# Patient Record
Sex: FEMALE | Race: BLACK OR AFRICAN AMERICAN | Employment: FULL TIME | URBAN - METROPOLITAN AREA
[De-identification: names, ages, dates, MRNs, and addresses within clinical notes are randomized per-mention and may not be internally consistent; named-entity substitution may affect disease eponyms.]

---

## 2018-08-02 ENCOUNTER — TELEPHONE (OUTPATIENT)
Dept: NEUROLOGY | Age: 53
End: 2018-08-02

## 2018-08-02 NOTE — TELEPHONE ENCOUNTER
----- Message from Driss Vences sent at 8/2/2018  9:26 AM EDT -----  Regarding: Dr. Kelly Coats  Pt is calling to speak with nurse regarding a sooner appt with Dr. Herve Chakraborty for migraines. She stated she has knots above her eyebrow where she had injection and wanted to know if was normal or not. She would like e-mail sent for practice address. If a sooner appt comes available leave detailed message she works 8 pm to 8 am.[phone]. PSR NOTE: Called Patient and scheduled her for 08/17/18 @ 9:00am w/ Dr. Herve Chakraborty. Patient was hoping to speak with nurse ahead of appt about knots at injection site.

## 2018-08-02 NOTE — TELEPHONE ENCOUNTER
Patient advised to contact the facility that did injection to let them know about reaction.  Pt voiced understanding , pt does have follow up scheduled for 8/17

## 2018-08-17 ENCOUNTER — OFFICE VISIT (OUTPATIENT)
Dept: NEUROLOGY | Age: 53
End: 2018-08-17

## 2018-08-17 VITALS
HEIGHT: 65 IN | HEART RATE: 78 BPM | DIASTOLIC BLOOD PRESSURE: 82 MMHG | SYSTOLIC BLOOD PRESSURE: 128 MMHG | RESPIRATION RATE: 16 BRPM | WEIGHT: 190 LBS | BODY MASS INDEX: 31.65 KG/M2 | OXYGEN SATURATION: 99 %

## 2018-08-17 DIAGNOSIS — G43.011 INTRACTABLE MIGRAINE WITHOUT AURA AND WITH STATUS MIGRAINOSUS: Primary | ICD-10-CM

## 2018-08-17 RX ORDER — ROSUVASTATIN CALCIUM 5 MG/1
TABLET, COATED ORAL
COMMUNITY
Start: 2018-08-10 | End: 2018-12-28

## 2018-08-17 RX ORDER — ONDANSETRON 4 MG/1
4 TABLET, FILM COATED ORAL
COMMUNITY
End: 2018-12-28

## 2018-08-17 RX ORDER — SUMATRIPTAN 100 MG/1
TABLET, FILM COATED ORAL
COMMUNITY
Start: 2018-07-01 | End: 2018-12-28

## 2018-08-17 RX ORDER — ZOLPIDEM TARTRATE 10 MG/1
TABLET ORAL
COMMUNITY
Start: 2018-08-03 | End: 2022-08-10 | Stop reason: ALTCHOICE

## 2018-08-17 RX ORDER — SUMATRIPTAN 3 MG/1
INJECTION, SOLUTION SUBCUTANEOUS
Qty: 8 SYRINGE | Refills: 3 | Status: SHIPPED | OUTPATIENT
Start: 2018-08-17 | End: 2018-09-18 | Stop reason: SDUPTHER

## 2018-08-17 RX ORDER — BUTALBITAL, ACETAMINOPHEN AND CAFFEINE 300; 40; 50 MG/1; MG/1; MG/1
CAPSULE ORAL
COMMUNITY
Start: 2018-08-13 | End: 2018-12-28

## 2018-08-17 RX ORDER — ESTRADIOL 0.5 MG/1
TABLET ORAL
COMMUNITY
Start: 2018-05-13

## 2018-08-17 NOTE — PATIENT INSTRUCTIONS
[facility]   Statement to Patients  April 1, 2014      In an effort to ensure the large volume of patient prescription refills is processed in the most efficient and expeditious manner, we are asking our patients to assist us by calling your Pharmacy for all prescription refills, this will include also your  Mail Order Pharmacy. The pharmacy will contact our office electronically to continue the refill process. Please do not wait until the last minute to call your pharmacy. We need at least 48 hours (2days) to fill prescriptions. We also encourage you to call your pharmacy before going to  your prescription to make sure it is ready. With regard to controlled substance prescription refill requests (narcotic refills) that need to be picked up at our office, we ask your cooperation by providing us with at least 72 hours (3days) notice that you will need a refill. We will not refill narcotic prescription refill requests after 4:00pm on any weekday, Monday through Thursday, or after 2:00pm on Fridays, or on the weekends. We encourage everyone to explore another way of getting your prescription refill request processed using [employer]aze [employer], our patient web portal through our electronic medical record system. Blaze DFM is an efficient and effective way to communicate your medication request directly to the office and  downloadable as an griselda on your smart phone . Blaze DFM also features a review functionality that allows you to view your medication list as well as leave messages for your physician. Are you ready to get connected? If so please review the attatched instructions or speak to any of our staff to get you set up right away! Thank you so much for your cooperation. Should you have any questions please contact our Practice Administrator.     The Physicians and Staff,  [facility]   Patient Instruction Plan/ Result Policy    If we have ordered testing for you, know that; \"NO NEWS IS GOOD NEWS! \" It is our policy that we know longer call patients with results, nor do we  give test results over the phone. We schedule follow up appointments so that your results can be discussed in person. This allows you to address any questions you have regarding the results. If something of concern is revealed on your test, we will contact you to discuss the matter and if needed schedule a sooner follow up appointment. Additionally, results may be found by using the My Chart feature and one of our patient service representatives at the  can give you instructions on how to access this feature to utilize our electronic medical record system. Thank you for your understanding.

## 2018-08-17 NOTE — PROGRESS NOTES
[address] [facility]. [address]   [place] [address] [place] [address]   [phone] [facility]   [phone] Fax             Referring: Self    Chief Complaint   Patient presents with    Head Pain     72-year-old right-handed woman who presents today for evaluation of what she calls 3-4 day migraines. She tells me that she has had headaches for over 10 years now. She has seen several neurologists. She notes that she has taken a multitude of preventative medications in the past and she has been told by her previous neurologist that he has done everything for her. She has been on preventatives including Depakote Topamax TCAs etc.  She was referred for Botox. She has had 2 rounds of Botox and has not noticed any changes in her headache frequency. She tells me that she is having in an average month 3-4 migraines and that they are lasting 3-4 days. She is unable to get significant relief. She is using oral sumatriptan and says she will get relief with that but then she gets recurrence. She has to take it 3 days in a row. It makes her nauseated. She has also used rizatriptan in the past.  She most recently was seen down at [facility]. I have reviewed those records where she had difficulty with topiramate making her jittery dizzy and uncomfortable. She had difficulty with Depakote as well. She failed rizatriptan. She was also given Benadryl Compazine and Toradol. Not had any focal deficits with a headache. She does have significant nausea. She is use Zofran and has not been helpful. She has never had any loss of consciousness with a headache. No palpitations. No chest pain. No shortness of breath. She is also tried verapamil. That was not effective. She is tried steroids in the past as well.       Past Medical History:   Diagnosis Date    Migraine        Past Surgical History:   Procedure Laterality Date    HX GYN  2010    hysterectomy       Current Outpatient Prescriptions   Medication Sig Dispense Refill    butalbital-acetaminophen-caff (FIORICET) -40 mg per capsule       estradiol (ESTRACE) 0.5 mg tablet       rosuvastatin (CRESTOR) 5 mg tablet       SUMAtriptan (IMITREX) 100 mg tablet       zolpidem (AMBIEN) 10 mg tablet       ondansetron hcl (ZOFRAN) 4 mg tablet Take 4 mg by mouth every eight (8) hours as needed for Nausea.  cyclobenzaprine (FLEXERIL) 10 mg tablet Take 1 tablet by mouth three (3) times daily as needed for Muscle Spasm(s). 20 tablet 0    tramadol (ULTRAM) 50 mg tablet Take 1 tablet by mouth every six (6) hours as needed for Pain. 15 tablet 0    conjugated estrogens (PREMARIN) 0.3 mg tablet Take  by mouth. Allergies   Allergen Reactions    Sulfa (Sulfonamide Antibiotics) Unknown (comments)     Broke out in a rash in her mouth       Social History   Substance Use Topics    Smoking status: Never Smoker    Smokeless tobacco: Never Used    Alcohol use Yes      Comment: rarely       Family History   Problem Relation Age of Onset    Diabetes Mother     Cancer Father        Review of Systems  Pertinent positives and negatives as noted with remainder of comprehensive review negative    Examination  Visit Vitals    /82    Pulse 78    Resp 16    Ht 5' 5\" (1.651 m)    Wt 86.2 kg (190 lb)    LMP 11/14/1992    SpO2 99%    BMI 31.62 kg/m2     Pleasant, well appearing. No icterus. Oropharynx clear. Supple neck without bruit. Heart regular. No murmur. No edema. Neurologically, she is awake, alert, and oriented with normal speech and language. Her cognition is normal.  She is able to discuss her medical history. She is able to discuss her medications. She is able to discuss current events. Intact cranial nerves 2-12. No nystagmus. Visual fields full to confrontation. Disk margins are flat bilaterally. She has normal bulk and tone. She has no abnormal movement. She has no pronation or drift.   She generates full strength in the upper and lower extremities to direct confrontational testing. Reflexes are symmetrical in the upper and lower extremities bilaterally. Her toes are down bilaterally. No Hyman. Finger nose finger and rapid alternating movements are normal.  Steady gait. She is able to heel, toe, and tandem walk. No sensory deficit to primary modalities. No Romberg. Impression/Plan  82-year-old lady with refractory migraine headaches with failures of multiple preventative medications including Botox. Discussed options. Discussed migraine pathophysiology. Discussed the new CG RP inhibitors. Will start Aimovig and discussed administration, potential side effects, potential benefits. She is looking forward to trying this. Discussed that this is targeted to the chemical that is thought to cause migraine. Will start that process. We will have her track her headaches on a calendar bring that each appointment. In terms of abortive therapy she has had difficulty using the oral preparations sumatriptan. Would like to try to use an injectable to get rapid relief and discussed using Zembrace 3 mg injection. Discussed and demonstrated the administration. Discussed potential side effects. Confirm she has not had any type of vascular disease including coronary artery disease or uncontrolled hypertension. She will use 1 injection at headache onset repeat in 1 hour and she can use up to 4 injections in 24 hours. Follow-up in 3 months with her headache diary and this will give her time to be on the Aimovig ×2 months to give an adequate trial.    Jules Pandey MD      This note was created using voice recognition software. Despite editing, there may be syntax errors. This note will not be viewable in [address]

## 2018-08-17 NOTE — MR AVS SNAPSHOT
[address] [facility] [address] [facility] [address] [place] [id number] [phone] Patient: Madhav Ambriz MRN: [id number] YSS:[id number] Visit Information Date & Time Provider Department Dept. Phone Encounter #  
 8/17/2018  9:30 AM Alex Chavarria MD [facility] [phone] [id number] Follow-up Instructions Return in about 3 months (around 11/17/2018). Upcoming Health Maintenance Date Due  
 PAP AKA CERVICAL CYTOLOGY 10/1/2015 BREAST CANCER SCRN MAMMOGRAM 1/30/2017 FOBT Q 1 YEAR AGE 50-75 1/30/2017 DTaP/Tdap/Td series (2 - Td) 10/1/2022 Allergies as of 8/17/2018  Review Complete On: 8/17/2018 By: Alex Chavarria MD  
  
 Severity Noted Reaction Type Reactions Sulfa (Sulfonamide Antibiotics)  07/04/2011    Unknown (comments) Broke out in a rash in her mouth Current Immunizations  Never Reviewed Name Date TDAP Vaccine 10/1/2012 Not reviewed this visit Vitals BP Pulse Resp Height(growth percentile) Weight(growth percentile) LMP  
 128/82 78 16 5' 5\" (1.651 m) 190 lb (86.2 kg) 11/14/1992 SpO2 BMI OB Status Smoking Status 99% 31.62 kg/m2 Hysterectomy Never Smoker BMI and BSA Data Body Mass Index Body Surface Area  
 31.62 kg/m 2 1.99 m 2 Preferred Pharmacy Pharmacy Name Phone [address], [address] [place] [address] [address] [facility] [address] [phone] Your Updated Medication List  
  
   
This list is accurate as of 8/17/18 10:55 AM.  Always use your most recent med list.  
  
  
  
  
 butalbital-acetaminophen-caff -40 mg per capsule Commonly known as:  FIORICET  
  
 cyclobenzaprine 10 mg tablet Commonly known as:  FLEXERIL Take 1 tablet by mouth three (3) times daily as needed for Muscle Spasm(s). erenumab-aooe 70 mg/mL Atin Commonly known as:  AIMOVIG AUTOINJECTOR  
 70 mg by SubCUTAneous route every month.  
  
 estradiol 0.5 mg tablet Commonly known as:  ESTRACE  
  
 PREMARIN 0.3 mg tablet Generic drug:  conjugated estrogens Take  by mouth. rosuvastatin 5 mg tablet Commonly known as:  CRESTOR  
  
 * SUMAtriptan 100 mg tablet Commonly known as:  IMITREX * ZEMBRACE SYMTOUCH 3 mg/0.5 mL Pnij Generic drug:  SUMAtriptan succinate 1 at HA onset and repeat q 1 hour until DODD relieved or used 4 in 24 hours BRAND MEDICALLY NECESSARY  
  
 traMADol 50 mg tablet Commonly known as:  ULTRAM  
Take 1 tablet by mouth every six (6) hours as needed for Pain. ZOFRAN 4 mg tablet Generic drug:  ondansetron hcl Take 4 mg by mouth every eight (8) hours as needed for Nausea. zolpidem 10 mg tablet Commonly known as:  AMBIEN  
  
 * Notice: This list has 2 medication(s) that are the same as other medications prescribed for you. Read the directions carefully, and ask your doctor or other care provider to review them with you. Prescriptions Sent to Pharmacy Refills  
 erenumab-aooe (AIMOVIG AUTOINJECTOR) 70 mg/mL atIn 3 Si mg by SubCUTAneous route every month. Class: Normal  
 Pharmacy: [facility] [address] AAYUSH DAVENPORT [address], [address] [place] [address] [address] [place] [address] Ph #: [phone] Route: SubCUTAneous ZEMBRACE SYMTOUCH 3 mg/0.5 mL pnij 3 Si at HA onset and repeat q 1 hour until DODD relieved or used 4 in 24 hours BRAND MEDICALLY NECESSARY Class: Normal  
 Pharmacy: [facility] [address] AAYUSH DAVENPORT [address], [address] [place] [address] [address] [facility] [address] Ph #: [phone] Follow-up Instructions Return in about 3 months (around 2018). Patient Instructions PRESCRIPTION REFILL POLICY [facility] Statement to Patients 2014 In an effort to ensure the large volume of patient prescription refills is processed in the most efficient and expeditious manner, we are asking our patients to assist us by calling your Pharmacy for all prescription refills, this will include also your  Mail Order Pharmacy. The pharmacy will contact our office electronically to continue the refill process. Please do not wait until the last minute to call your pharmacy. We need at least 48 hours (2days) to fill prescriptions. We also encourage you to call your pharmacy before going to  your prescription to make sure it is ready. With regard to controlled substance prescription refill requests (narcotic refills) that need to be picked up at our office, we ask your cooperation by providing us with at least 72 hours (3days) notice that you will need a refill. We will not refill narcotic prescription refill requests after 4:00pm on any weekday, Monday through Thursday, or after 2:00pm on Fridays, or on the weekends. We encourage everyone to explore another way of getting your prescription refill request processed using H[employer], our patient web portal through our electronic medical record system. Hoppit is an efficient and effective way to communicate your medication request directly to the office and  downloadable as an griselda on your smart phone . H[employer]it also features a review functionality that allows you to view your medication list as well as leave messages for your physician. Are you ready to get connected? If so please review the attatched instructions or speak to any of our staff to get you set up right away! Thank you so much for your cooperation. Should you have any questions please contact our Practice Administrator. The Physicians and Staff,  [facility] Patient Instruction Plan/ Result Policy If we have ordered testing for you, know that; \"NO NEWS IS GOOD NEWS! \" It is our policy that we know longer call patients with results, nor do we  give test results over the phone.   We schedule follow up appointments so that your results can be discussed in person. This allows you to address any questions you have regarding the results. If something of concern is revealed on your test, we will contact you to discuss the matter and if needed schedule a sooner follow up appointment. Additionally, results may be found by using the My Chart feature and one of our patient service representatives at the  can give you instructions on how to access this feature to utilize our electronic medical record system. Thank you for your understanding. Introducing [facility]! [facility] introduces Agilyx patient portal. Now you can access parts of your medical record, email your doctor's office, and request medication refills online. 1. In your internet browser, go to [email or web address] 2. Click on the First Time User? Click Here link in the Sign In box. You will see the New Member Sign Up page. 3. Enter your Agilyx Access Code exactly as it appears below. You will not need to use this code after youve completed the sign-up process. If you do not sign up before the expiration date, you must request a new code. · Agilyx Access Code: [email or web address] Expires: 11/15/2018 10:55 AM 
 
4. Enter the last four digits of your Social Security Number (xxxx) and Date of Birth (mm/dd/yyyy) as indicated and click Submit. You will be taken to the next sign-up page. 5. Create a Agilyx ID. This will be your Agilyx login ID and cannot be changed, so think of one that is secure and easy to remember. 6. Create a Agilyx password. You can change your password at any time. 7. Enter your Password Reset Question and Answer. This can be used at a later time if you forget your password. 8. Enter your e-mail address. You will receive e-mail notification when new information is available in [address] 9. Click Sign Up. You can now view and download portions of your medical record. 10. Click the Download Summary menu link to download a portable copy of your medical information. If you have questions, please visit the Frequently Asked Questions section of the ShoeDazzle website. Remember, ShoeDazzle is NOT to be used for urgent needs. For medical emergencies, dial 911. Now available from your iPhone and Android! Please provide this summary of care documentation to your next provider. Your primary care clinician is listed as Brian Sloan. If you have any questions after today's visit, please call [phone].

## 2018-08-17 NOTE — PROGRESS NOTES
Chief Complaint   Patient presents with    Head Pain     1. Have you been to the ER, urgent care clinic since your last visit? Hospitalized since your last visit? No    2. Have you seen or consulted any other health care providers outside of the [address] [place] [address] since your last visit? Include any pap smears or colon screening.  No     Visit Vitals    /82    Pulse 78    Resp 16    Ht 5' 5\" (1.651 m)    Wt 86.2 kg (190 lb)    SpO2 99%    BMI 31.62 kg/m2

## 2018-08-23 NOTE — TELEPHONE ENCOUNTER
----- Message from Nella Mcginnis sent at 8/23/2018  3:15 PM EDT -----  Regarding: Dr. Bella Romo  Pt has left several messages regarding the medications zembrace sym touch and Aimobi (medications spelled by patient) and has gotten a response. Pt's insurance company has even tried to contact Dr Cornelius Tapia, but did not receive a response either. The insurance company needs the doctor to contact them as soon as possible. The insurance company sent a fax regarding what is needed in order for these medications to be approved. Pt needs a call back today regarding these medications. Pt has been waiting for 2 weeks. Pt can be reached [phone].

## 2018-08-24 NOTE — TELEPHONE ENCOUNTER
----- Message from Corrina Hamilton sent at 8/24/2018  1:14 PM EDT -----  Regarding: Dr. Serg Buchanan stated she left several message regarding a refill on her Rx(\"Zembrace\" Symtouch and \"Aimovig\") called to [facility], and still no response from the doctor, and no call back. Pt requested a call back today with the status. Best contact number [phone].

## 2018-08-31 ENCOUNTER — TELEPHONE (OUTPATIENT)
Dept: NEUROLOGY | Age: 53
End: 2018-08-31

## 2018-08-31 NOTE — TELEPHONE ENCOUNTER
Received [employer] Rx PA denial letter regarding Zembrace 3mg/0.5ml #8 syringes /30 days   Reason: patient has step therapy and must try all of the following medications Sumatriptan tab, sumatriptan nasal spray , sumatriptan injectable 4mg or 6mg     PA case closed unfavorable for patient     Clinical staff informed of this matter

## 2018-09-04 ENCOUNTER — TELEPHONE (OUTPATIENT)
Dept: NEUROLOGY | Age: 53
End: 2018-09-04

## 2018-09-04 RX ORDER — SUMATRIPTAN 6 MG/.5ML
6 INJECTION, SOLUTION SUBCUTANEOUS ONCE
Qty: 3 ML | Refills: 1
Start: 2018-09-04 | End: 2018-09-04

## 2018-09-04 NOTE — TELEPHONE ENCOUNTER
Received [employer] Rx PA denial notice regarding Aimovig 70 mg /ml #1/30 days  Reason: headaches per month with at least 8 migraines days per month.  Document failure  Least 2 different prescription migraine prevention no concurrent use of  Botox     PA case closed unfavorable for patient    Clinical staff in formed of this matter

## 2018-09-04 NOTE — TELEPHONE ENCOUNTER
----- Message from [facility] sent at 9/4/2018  2:59 PM EDT -----  Regarding: Dr. Maday Benedict telephone  The pt returned the call she received to see which medication was called in for her or if it was both.       Best contact number is [phone]

## 2018-09-05 NOTE — TELEPHONE ENCOUNTER
Spoke with patient. Advised the Imitrex is at [address]. Patient stated she had 2 samples of Amovig left and she will use them before picking up the Imitrex from [facility]. Also advised patient that if she starts Imitrex and continues to have side effects to call the office and we will try again to get the Omar Wilson approved by her insurance. Patient verbalized understanding.

## 2018-09-12 RX ORDER — SUMATRIPTAN 6 MG/.5ML
6 INJECTION, SOLUTION SUBCUTANEOUS ONCE
Qty: 3 ML | Refills: 1 | Status: SHIPPED | OUTPATIENT
Start: 2018-09-12 | End: 2018-09-12

## 2018-09-13 ENCOUNTER — TELEPHONE (OUTPATIENT)
Dept: NEUROLOGY | Age: 53
End: 2018-09-13

## 2018-09-13 NOTE — TELEPHONE ENCOUNTER
Patient called stated that the imitrex inj. ins't working and the zemdrace isn't working  Patient would like to talk with the nurse

## 2018-09-17 NOTE — TELEPHONE ENCOUNTER
Order Zembrace per Dr. Kenzie Chambers' instruction. PA to be restarted stating that nasal spray , sumatriptan injectable 4mg or 6mg have been tried/failed.

## 2018-09-18 RX ORDER — SUMATRIPTAN 3 MG/1
INJECTION, SOLUTION SUBCUTANEOUS
Qty: 8 SYRINGE | Refills: 3 | Status: SHIPPED | OUTPATIENT
Start: 2018-09-18 | End: 2018-12-28

## 2018-09-18 NOTE — TELEPHONE ENCOUNTER
Left message that Zembrace rx has been sent to pharmacy on record and that PA has been initiated with insurance co.

## 2018-09-18 NOTE — TELEPHONE ENCOUNTER
Order Zembrace per Dr. Juan Carlos Alberto' instruction. PA to be restarted stating that nasal spray , sumatriptan injectable 4mg or 6mg have been tried/failed.

## 2018-09-21 ENCOUNTER — TELEPHONE (OUTPATIENT)
Dept: NEUROLOGY | Age: 53
End: 2018-09-21

## 2018-09-21 NOTE — TELEPHONE ENCOUNTER
----- Message from [facility] sent at 9/21/2018 10:44 AM EDT -----  Regarding: Dr. Mihaela Don: [phone]  Pt is requesting the nurse about her medication and did not work \"Zembrace not working\" and \"Imitrex\" does not work  Pt called 2 days ago and no response to this.

## 2018-09-21 NOTE — TELEPHONE ENCOUNTER
Left message for patient to continue trying Zembrace and that prescription has been sent to pharmacy on record, and PA is being resubmitted.

## 2018-09-24 NOTE — TELEPHONE ENCOUNTER
Patient is requesting sample of Zembrace. Zembrace PA denied through insurance again on 9/21/18, stating that patient must try and fail all methods. We ordered the injection 9/12 and patient states that does not work. Re-ordered the [facility] 9/18 and it was denied again, the only method I can't find documented is the nasal spray. Please sign the sample request and order the nasal spray if appropriate.

## 2018-09-25 RX ORDER — ZOLMITRIPTAN 5 MG/1
1 SPRAY NASAL
Qty: 2 CONTAINER | Refills: 0 | Status: SHIPPED | COMMUNITY
Start: 2018-09-25 | End: 2018-09-25

## 2018-09-25 RX ORDER — SUMATRIPTAN 20 MG/1
SPRAY NASAL
Qty: 1 CONTAINER | Refills: 5 | Status: SHIPPED | OUTPATIENT
Start: 2018-09-25 | End: 2019-03-29

## 2018-09-25 NOTE — TELEPHONE ENCOUNTER
R/t call to pharmacy. imitrex comes in quantity of 6. They wanted to make sure that we were okay if patient could get that. Confirmed that was fine per provider.

## 2018-09-25 NOTE — TELEPHONE ENCOUNTER
Patient's pharmacy called asking for a clarification on medication. Patient is at the pharmacy now. Would like a call back as soon as possible.

## 2018-09-25 NOTE — TELEPHONE ENCOUNTER
[facility] is calling to try to get clarification regarding RX for Pt.  Best contact [phone]

## 2018-12-28 ENCOUNTER — OFFICE VISIT (OUTPATIENT)
Dept: NEUROLOGY | Age: 53
End: 2018-12-28

## 2018-12-28 VITALS
HEIGHT: 65 IN | BODY MASS INDEX: 32.49 KG/M2 | WEIGHT: 195 LBS | SYSTOLIC BLOOD PRESSURE: 122 MMHG | DIASTOLIC BLOOD PRESSURE: 88 MMHG

## 2018-12-28 DIAGNOSIS — G43.919 INTRACTABLE MIGRAINE WITHOUT STATUS MIGRAINOSUS, UNSPECIFIED MIGRAINE TYPE: Primary | ICD-10-CM

## 2018-12-28 RX ORDER — ZOLMITRIPTAN 5 MG/1
1 SPRAY NASAL
Qty: 2 CONTAINER | Refills: 0 | Status: SHIPPED | COMMUNITY
Start: 2018-12-28 | End: 2018-12-28

## 2018-12-28 RX ORDER — ZOLMITRIPTAN 5 MG/1
SPRAY NASAL
Qty: 6 CONTAINER | Refills: 5 | Status: SHIPPED | OUTPATIENT
Start: 2018-12-28 | End: 2019-01-17 | Stop reason: SDUPTHER

## 2018-12-28 NOTE — PATIENT INSTRUCTIONS
[address] [facility]   Statement to Patients  April 1, 2014      In an effort to ensure the large volume of patient prescription refills is processed in the most efficient and expeditious manner, we are asking our patients to assist us by calling your Pharmacy for all prescription refills, this will include also your  Mail Order Pharmacy. The pharmacy will contact our office electronically to continue the refill process. Please do not wait until the last minute to call your pharmacy. We need at least 48 hours (2days) to fill prescriptions. We also encourage you to call your pharmacy before going to  your prescription to make sure it is ready. With regard to controlled substance prescription refill requests (narcotic refills) that need to be picked up at our office, we ask your cooperation by providing us with at least 72 hours (3days) notice that you will need a refill. We will not refill narcotic prescription refill requests after 4:00pm on any weekday, Monday through Thursday, or after 2:00pm on Fridays, or on the weekends. We encourage everyone to explore another way of getting your prescription refill request processed using [employer], our patient web portal through our electronic medical record system. [employer] is an efficient and effective way to communicate your medication request directly to the office and  downloadable as an griselda on your smart phone . [employer] also features a review functionality that allows you to view your medication list as well as leave messages for your physician. Are you ready to get connected? If so please review the attatched instructions or speak to any of our staff to get you set up right away! Thank you so much for your cooperation. Should you have any questions please contact our Practice Administrator.     The Physicians and Staff,  [facility]

## 2018-12-28 NOTE — TELEPHONE ENCOUNTER
Patient received samples of zomig nasal spray at office visit today     zomig 5mg nasal spray IMPAX    7/20

## 2018-12-28 NOTE — PROGRESS NOTES
pharmacy informed patient to have blood work every month because aimovig could make her more susceptible to infections. Headaches have decreased. Nasal spray does help. She received samples at last office visit which help more than what was sent in to pharmacy.

## 2018-12-29 LAB
ALBUMIN SERPL-MCNC: 4 G/DL (ref 3.5–5.5)
ALBUMIN/GLOB SERPL: 1.7 {RATIO} (ref 1.2–2.2)
ALP SERPL-CCNC: 83 IU/L (ref 39–117)
ALT SERPL-CCNC: 12 IU/L (ref 0–32)
AST SERPL-CCNC: 15 IU/L (ref 0–40)
BASOPHILS # BLD AUTO: 0 X10E3/UL (ref 0–0.2)
BASOPHILS NFR BLD AUTO: 1 %
BILIRUB DIRECT SERPL-MCNC: 0.08 MG/DL (ref 0–0.4)
BILIRUB SERPL-MCNC: 0.3 MG/DL (ref 0–1.2)
BUN SERPL-MCNC: 12 MG/DL (ref 6–24)
BUN/CREAT SERPL: 15 (ref 9–23)
CALCIUM SERPL-MCNC: 9.2 MG/DL (ref 8.7–10.2)
CHLORIDE SERPL-SCNC: 105 MMOL/L (ref 96–106)
CO2 SERPL-SCNC: 26 MMOL/L (ref 20–29)
CREAT SERPL-MCNC: 0.79 MG/DL (ref 0.57–1)
EOSINOPHIL # BLD AUTO: 0.1 X10E3/UL (ref 0–0.4)
EOSINOPHIL NFR BLD AUTO: 2 %
ERYTHROCYTE [DISTWIDTH] IN BLOOD BY AUTOMATED COUNT: 12.7 % (ref 12.3–15.4)
GLOBULIN SER CALC-MCNC: 2.4 G/DL (ref 1.5–4.5)
GLUCOSE SERPL-MCNC: 89 MG/DL (ref 65–99)
HCT VFR BLD AUTO: 32.1 % (ref 34–46.6)
HGB BLD-MCNC: 11 G/DL (ref 11.1–15.9)
IMM GRANULOCYTES # BLD: 0 X10E3/UL (ref 0–0.1)
IMM GRANULOCYTES NFR BLD: 0 %
LYMPHOCYTES # BLD AUTO: 2 X10E3/UL (ref 0.7–3.1)
LYMPHOCYTES NFR BLD AUTO: 41 %
MCH RBC QN AUTO: 30.7 PG (ref 26.6–33)
MCHC RBC AUTO-ENTMCNC: 34.3 G/DL (ref 31.5–35.7)
MCV RBC AUTO: 90 FL (ref 79–97)
MONOCYTES # BLD AUTO: 0.4 X10E3/UL (ref 0.1–0.9)
MONOCYTES NFR BLD AUTO: 8 %
NEUTROPHILS # BLD AUTO: 2.3 X10E3/UL (ref 1.4–7)
NEUTROPHILS NFR BLD AUTO: 48 %
PLATELET # BLD AUTO: 301 X10E3/UL (ref 150–379)
POTASSIUM SERPL-SCNC: 4.4 MMOL/L (ref 3.5–5.2)
PROT SERPL-MCNC: 6.4 G/DL (ref 6–8.5)
RBC # BLD AUTO: 3.58 X10E6/UL (ref 3.77–5.28)
SODIUM SERPL-SCNC: 145 MMOL/L (ref 134–144)
WBC # BLD AUTO: 4.9 X10E3/UL (ref 3.4–10.8)

## 2018-12-31 NOTE — PROGRESS NOTES
Scott Hughes is a 48 y.o. female who presents with the following  Chief Complaint   Patient presents with    Follow-up       HPI  Patient comes in with  for a follow up for migraines. As a recap, she notes that she has taken a multitude of preventative medications in the past and she has been told by her previous neurologist that he has done everything for her. She has been on preventatives including Depakote Topamax TCAs etc.  She was referred for Botox. She has had 2 rounds of Botox and has not noticed any changes in her headache frequency. She tells me that she is having in an average month 3-4 migraines and that they are lasting 3-4 days. She is unable to get significant relief. She is using oral sumatriptan and says she will get relief with that but then she gets recurrence. She has to take it 3 days in a row. It makes her nauseated. She has also used rizatriptan in the past.  She most recently was seen down at [facility]. I have reviewed those records where she had difficulty with topiramate making her jittery dizzy and uncomfortable. She had difficulty with Depakote as well. She failed rizatriptan. She was also given Benadryl Compazine and Toradol. Not had any focal deficits with a headache. She does have significant nausea. She is use Zofran and has not been helpful. She is also tried verapamil. That was not effective. She has tried steroids in the past as well. Allergies   Allergen Reactions    Sulfa (Sulfonamide Antibiotics) Unknown (comments)     Broke out in a rash in her mouth       Current Outpatient Medications   Medication Sig    ZOLMitriptan (ZOMIG) 5 mg nasal solution 1 spray in 1 nostril at onset of migraine; may repeat in 2 hours x 1. Limit: 2 sprays in 24 hours    erenumab-aooe (AIMOVIG AUTOINJECTOR) 70 mg/mL atIn 70 mg by SubCUTAneous route every month.  SUMAtriptan (IMITREX) 20 mg/actuation nasal spray 1 spray in 1 nostril at HA onset.  May repeat again in 2 hours X 1 dose. Max 2 doses in 24 hours.  estradiol (ESTRACE) 0.5 mg tablet     zolpidem (AMBIEN) 10 mg tablet      No current facility-administered medications for this visit. Social History     Tobacco Use   Smoking Status Never Smoker   Smokeless Tobacco Never Used       Past Medical History:   Diagnosis Date    Migraine        Past Surgical History:   Procedure Laterality Date    HX GYN  2010    hysterectomy       Family History   Problem Relation Age of Onset    Diabetes Mother     Cancer Father        Social History     Socioeconomic History    Marital status:      Spouse name: Not on file    Number of children: Not on file    Years of education: Not on file    Highest education level: Not on file   Tobacco Use    Smoking status: Never Smoker    Smokeless tobacco: Never Used   Substance and Sexual Activity    Alcohol use: Yes     Comment: rarely    Drug use: No    Sexual activity: Yes     Partners: Male     Birth control/protection: None       Review of Systems   Eyes: Positive for blurred vision and photophobia. Cardiovascular: Negative for chest pain and palpitations. Gastrointestinal: Positive for nausea and vomiting. Neurological: Positive for headaches. Negative for tingling, tremors, seizures and loss of consciousness. Remainder of comprehensive review is negative. Physical Exam :    Visit Vitals  /88   Ht 5' 5\" (1.651 m)   Wt 88.5 kg (195 lb)   LMP 11/14/1992   BMI 32.45 kg/m²       General: Well defined, nourished, and groomed individual in no acute distress.    Neck: Supple, nontender, no bruits, no pain with resistance to active range of motion.    Heart: Regular rate and rhythm, no murmurs, rub, or gallop. Normal S1S2.   Lungs: Clear to auscultation bilaterally with equal chest expansion, no cough, no wheeze  Musculoskeletal: Extremities revealed no edema and had full range of motion of joints.    Psych: Good mood and bright affect    NEUROLOGICAL EXAMINATION:    Mental Status: Alert and oriented to person, place, and time    Cranial Nerves:    II, III, IV, VI: Visual acuity grossly intact. Visual fields are normal.    Pupils are equal, round, and reactive to light and accommodation.    Extra-ocular movements are full and fluid. Fundoscopic exam was benign, no ptosis or nystagmus.    V-XII: Hearing is grossly intact. Facial features are symmetric, with normal sensation and strength. The palate rises symmetrically and the tongue protrudes midline. Sternocleidomastoids 5/5. Motor Examination: Normal tone, bulk, and strength, 5/5 muscle strength throughout. Coordination: Finger to nose was normal. No resting or intention tremor    Gait and Station: Steady while walking. Normal arm swing. No pronator drift. No muscle wasting or fasiculations noted. Reflexes: DTRs 2+ throughout. Results for orders placed or performed in visit on 12/28/18   CBC WITH AUTOMATED DIFF   Result Value Ref Range    WBC 4.9 3.4 - 10.8 x10E3/uL    RBC 3.58 (L) 3.77 - 5.28 x10E6/uL    HGB 11.0 (L) 11.1 - 15.9 g/dL    HCT 32.1 (L) 34.0 - 46.6 %    MCV 90 79 - 97 fL    MCH 30.7 26.6 - 33.0 pg    MCHC 34.3 31.5 - 35.7 g/dL    RDW 12.7 12.3 - 15.4 %    PLATELET 142 280 - 049 x10E3/uL    NEUTROPHILS 48 Not Estab. %    Lymphocytes 41 Not Estab. %    MONOCYTES 8 Not Estab. %    EOSINOPHILS 2 Not Estab. %    BASOPHILS 1 Not Estab. %    ABS. NEUTROPHILS 2.3 1.4 - 7.0 x10E3/uL    Abs Lymphocytes 2.0 0.7 - 3.1 x10E3/uL    ABS. MONOCYTES 0.4 0.1 - 0.9 x10E3/uL    ABS. EOSINOPHILS 0.1 0.0 - 0.4 x10E3/uL    ABS. BASOPHILS 0.0 0.0 - 0.2 x10E3/uL    IMMATURE GRANULOCYTES 0 Not Estab. %    ABS. IMM.  GRANS. 0.0 0.0 - 0.1 C32A5/XE   METABOLIC PANEL, COMPREHENSIVE   Result Value Ref Range    Glucose 89 65 - 99 mg/dL    BUN 12 6 - 24 mg/dL    Creatinine 0.79 0.57 - 1.00 mg/dL    GFR est non-AA 87 >59 mL/min/1.73    GFR est  >59 mL/min/1.73    BUN/Creatinine ratio 15 9 - 23    Sodium 145 (H) 134 - 144 mmol/L    Potassium 4.4 3.5 - 5.2 mmol/L    Chloride 105 96 - 106 mmol/L    CO2 26 20 - 29 mmol/L    Calcium 9.2 8.7 - 10.2 mg/dL    Protein, total 6.4 6.0 - 8.5 g/dL    Albumin 4.0 3.5 - 5.5 g/dL    GLOBULIN, TOTAL 2.4 1.5 - 4.5 g/dL    A-G Ratio 1.7 1.2 - 2.2    Bilirubin, total 0.3 0.0 - 1.2 mg/dL    Alk. phosphatase 83 39 - 117 IU/L    AST (SGOT) 15 0 - 40 IU/L    ALT (SGPT) 12 0 - 32 IU/L   HEPATIC FUNCTION PANEL   Result Value Ref Range    Bilirubin, direct 0.08 0.00 - 0.40 mg/dL       Orders Placed This Encounter    CBC WITH AUTOMATED DIFF    METABOLIC PANEL, COMPREHENSIVE    HEPATIC FUNCTION PANEL    ZOLMitriptan (ZOMIG) 5 mg nasal solution     Si spray in 1 nostril at onset of migraine; may repeat in 2 hours x 1. Limit: 2 sprays in 24 hours     Dispense:  6 Container     Refill:  5    erenumab-aooe (AIMOVIG AUTOINJECTOR) 70 mg/mL atIn     Si mg by SubCUTAneous route every month. Dispense:  1 Syringe     Refill:  5       1. Intractable migraine without status migrainosus, unspecified migraine type        Follow-up Disposition: Not on File    Migraines are a little better with Aimovig 70 mg. She feels they have been cut down since starting but are still very debilitating and she has not been able to get back to work yet. She has tried and failed multiple rescue medications. She does not want to increase Aimovig yet. She will try Zomig nasal as she has failed Imitrex nasal spray and multiple other oral triptans, injection even. She will get some blood work to evaluate her headaches further. We discussed with Nanette Ngo no need to blood but the pharmacy told her she needed it. Keep track. Call with results.          This note will not be viewable in [employer]

## 2019-01-09 ENCOUNTER — TELEPHONE (OUTPATIENT)
Dept: NEUROLOGY | Age: 54
End: 2019-01-09

## 2019-01-09 NOTE — TELEPHONE ENCOUNTER
----- Message from Noah Da Silva sent at 1/9/2019  2:17 PM EST -----  Regarding: CAROLINA Mack/Telephone   Pt is reporting that she was told by the pharmacy the the Zomig Nasal Spray would cost over $400. Pt is inquiring if there is another medication that can be substituted. The sumatriptan burns her face and makes her nauseous.     Best contact # [phone]

## 2019-01-09 NOTE — TELEPHONE ENCOUNTER
Received PA request for ZOMIG. Sumitted through cover my meds. Received approval effective 01/09/19-01/09/20. Faxed confirmation to pharmacy.  Copy to chart

## 2019-01-16 ENCOUNTER — TELEPHONE (OUTPATIENT)
Dept: NEUROLOGY | Age: 54
End: 2019-01-16

## 2019-01-16 NOTE — TELEPHONE ENCOUNTER
----- Message from Claudia Longoria sent at 1/16/2019  9:22 AM EST -----  Regarding: /Telephone  Pt called requesting a call back in regards to follow up if  FMLA paper work  were received . Paper work were due back by the 1/12/2019. [employer] stated extended date is 01/19/2019. Pt best contact number is [phone] .

## 2019-01-17 RX ORDER — ZOLMITRIPTAN 5 MG/1
SPRAY NASAL
Qty: 18 CONTAINER | Refills: 5 | Status: SHIPPED | OUTPATIENT
Start: 2019-01-17 | End: 2019-02-12

## 2019-01-17 NOTE — TELEPHONE ENCOUNTER
----- Message from Ifrah Ortega sent at 1/17/2019  9:19 AM EST -----  Regarding: CAROLINA Singleton/Telephone  Pt is calling again for [facility] paperwork. She stated it needs to be completed by tomorrow. It was supposed to be completed by the 12th. Pt's contact is [phone]. She is also calling to discuss medication that was too expensive.

## 2019-01-17 NOTE — TELEPHONE ENCOUNTER
----- Message from Ciara Culp sent at 1/16/2019  4:22 PM EST -----  Regarding: /Telephone  Pt is requesting a call back in reference to having  another medical certification form for  FMLA filled out. Pt states it has already expried on the 1/12/19, and she has sent several messages. Best contact number is [phone].

## 2019-01-17 NOTE — TELEPHONE ENCOUNTER
fmla forms rec'd and progress notes attached.   Placed in my to-do folder to hand to [facility] tomorrow when he is at this location    LVM with pt

## 2019-01-18 NOTE — TELEPHONE ENCOUNTER
Called and spoke to patient. Informed her that paperwork was faxed and a copy was put in mailing. Informed her that NP approved 2 samples of zomig.      Samples put aside in sample closet    Per NP:  Simran Paz can take  2 zomig samples (Routing comment)

## 2019-01-18 NOTE — TELEPHONE ENCOUNTER
Spoke with patient , patient would like the papers mailed to her house. confirmed address in [place]. Patient stated the zomig cost to much and needs something else. Also stated Sumatriptan burns her face no matter if its spray or pills. If the  Could call something else in for her.

## 2019-01-18 NOTE — TELEPHONE ENCOUNTER
Patient called stating that the sumatriptan medicine is \"burning her face\" and making her feel nauseated. Asked if she could  samples of zomig since it was too much through the pharmacy. Also asked about the [facility] paperwork. Maikel Lopez stated in previous message that [facility] paperwork was sent out already.  [phone]

## 2019-01-29 ENCOUNTER — TELEPHONE (OUTPATIENT)
Dept: NEUROLOGY | Age: 54
End: 2019-01-29

## 2019-01-29 NOTE — TELEPHONE ENCOUNTER
Pt called requesting more samples to help prevent headaches. She said she has picked up a few times in the past. She is also asking about new medication, she said she is having burning in her face and would like to talk with you. Best contact [phone].

## 2019-01-31 NOTE — TELEPHONE ENCOUNTER
Patient picked up 2 samples of zomig 1/18/19. Currently do not have anymore at either location and we can not continue to supply patient with samples. triptans do not work and cause a burning sensation. She has not tried [place].  Per NP she can get 4 samples of cambia

## 2019-02-06 ENCOUNTER — TELEPHONE (OUTPATIENT)
Dept: NEUROLOGY | Age: 54
End: 2019-02-06

## 2019-02-06 NOTE — TELEPHONE ENCOUNTER
Patient came to wu office and picked up 4 samples of cambia along with the education pack.  She will call back if it helps

## 2019-02-06 NOTE — TELEPHONE ENCOUNTER
----- Message from David Will sent at 2/6/2019 10:56 AM EST -----  Regarding: CAROLINA Mack/telephone  Contact: [phone]  Pt is having migraines and would like to come  sample medication today 2/6/19.

## 2019-02-11 ENCOUNTER — TELEPHONE (OUTPATIENT)
Dept: NEUROLOGY | Age: 54
End: 2019-02-11

## 2019-02-11 NOTE — TELEPHONE ENCOUNTER
Patient called stated that the samples that were given didn't work for her and is requesting a call from the nurse

## 2019-02-12 RX ORDER — ZOLMITRIPTAN 5 MG/1
TABLET, ORALLY DISINTEGRATING ORAL
Qty: 6 TAB | Refills: 2 | Status: SHIPPED | OUTPATIENT
Start: 2019-02-12 | End: 2019-02-20

## 2019-02-20 RX ORDER — ZOLMITRIPTAN 5 MG/1
TABLET, FILM COATED ORAL
Qty: 12 TAB | Refills: 5 | Status: SHIPPED | OUTPATIENT
Start: 2019-02-20 | End: 2019-03-29

## 2019-03-29 ENCOUNTER — OFFICE VISIT (OUTPATIENT)
Dept: NEUROLOGY | Age: 54
End: 2019-03-29

## 2019-03-29 ENCOUNTER — TELEPHONE (OUTPATIENT)
Dept: NEUROLOGY | Age: 54
End: 2019-03-29

## 2019-03-29 VITALS
SYSTOLIC BLOOD PRESSURE: 110 MMHG | HEIGHT: 65 IN | WEIGHT: 195 LBS | DIASTOLIC BLOOD PRESSURE: 88 MMHG | BODY MASS INDEX: 32.49 KG/M2

## 2019-03-29 DIAGNOSIS — G43.919 INTRACTABLE MIGRAINE WITHOUT STATUS MIGRAINOSUS, UNSPECIFIED MIGRAINE TYPE: Primary | ICD-10-CM

## 2019-03-29 DIAGNOSIS — H53.9 VISUAL DISTURBANCE: ICD-10-CM

## 2019-03-29 RX ORDER — ZOLMITRIPTAN 5 MG/1
SPRAY NASAL
COMMUNITY
End: 2020-01-23 | Stop reason: SDUPTHER

## 2019-03-29 RX ORDER — ONDANSETRON 4 MG/1
4 TABLET, FILM COATED ORAL
Qty: 30 TAB | Refills: 5 | Status: SHIPPED | OUTPATIENT
Start: 2019-03-29 | End: 2021-06-18 | Stop reason: ALTCHOICE

## 2019-03-29 NOTE — PROGRESS NOTES
Migraines are coming back. She does have at least 1 per week lasting 48 hours with medication. Went to [address] sometime within the past 2 months for chest pains. They suggested that she stop triptans.

## 2019-03-29 NOTE — TELEPHONE ENCOUNTER
She has tried and fialed Depakote, Topamax, Elavil. ..  Can not take beta blockers due to hypotension     Has done botox  X 2 before she came to us

## 2019-03-29 NOTE — TELEPHONE ENCOUNTER
PA DENIED for Aimovig. Denial reason states:    -No indication that patient has had minimum 6 month (2 injection) trial of botox (unless she is not a candidate or is otherwise unable to take it), and no indication medication will NOT be used with botox.   -Patient must try and fail ((unless she is not a candidate or is otherwise unable to take) at least two meds from other classes of migraine prevention therapies: Antiepileptics, antidepressants, beta blockers, and triptans.  -No concurrent use of other CGRP medications    Can appeal decision or provider can prescribe alternate therapy. Will scan denial into chart.

## 2019-03-29 NOTE — PROGRESS NOTES
Bhargav Jones is a 48 y.o. female who presents with the following  Chief Complaint   Patient presents with    Migraine       HPI    Patient comes in for a follow up for worsening migraines. She notes that she has taken a multitude of preventative medications in the past and she has been told by her previous neurologist that he has done everything for her. She has been on preventatives including Depakote Topamax TCAs. She has had 2 rounds of Botox and has not noticed any changes in her headache frequency before this. She tells me that she is having in an average month 3-4 migraines a week  and that they are lasting 3-4 days.    She is unable to get significant relief. She has also used rizatriptan in the past.    She had difficulty with topiramate making her jittery dizzy and uncomfortable. She had difficulty with Depakote as well.  She failed rizatriptan.    She was also given Benadryl Compazine and Toradol.  Not had any focal deficits with a headache.  She does have significant nausea. She is use Zofran and has not been helpful. She is also tried verapamil.  That was not effective.  She has tried steroids in the past as well. Started Aimovig 70 mg and this has helped a little bit. Wants to increase. She used zomig and this helped rescue. She was denied cambia. Never tried Zipsor. Went to the ER for chest pain and ischemia so the triptans were off limits now. Headaches are more frequent. Not had an MRI               Allergies   Allergen Reactions    Sulfa (Sulfonamide Antibiotics) Unknown (comments)     Broke out in a rash in her mouth       Current Outpatient Medications   Medication Sig    ZOLMitriptan (ZOMIG) 5 mg nasal solution by Nasal route once as needed for Migraine.  erenumab-aooe (AIMOVIG AUTOINJECTOR, 2 PACK,) 70 mg/mL atIn 140 mg by SubCUTAneous route every thirty (30) days.  Indications: Migraine Prevention    ondansetron hcl (ZOFRAN) 4 mg tablet Take 1 Tab by mouth every eight (8) hours as needed for Nausea.  erenumab-aooe (AIMOVIG AUTOINJECTOR) 70 mg/mL atIn 70 mg by SubCUTAneous route every month.  estradiol (ESTRACE) 0.5 mg tablet     zolpidem (AMBIEN) 10 mg tablet      No current facility-administered medications for this visit. Social History     Tobacco Use   Smoking Status Never Smoker   Smokeless Tobacco Never Used       Past Medical History:   Diagnosis Date    Migraine        Past Surgical History:   Procedure Laterality Date    HX GYN  2010    hysterectomy       Family History   Problem Relation Age of Onset    Diabetes Mother     Cancer Father        Social History     Socioeconomic History    Marital status:      Spouse name: Not on file    Number of children: Not on file    Years of education: Not on file    Highest education level: Not on file   Tobacco Use    Smoking status: Never Smoker    Smokeless tobacco: Never Used   Substance and Sexual Activity    Alcohol use: Yes     Comment: rarely    Drug use: No    Sexual activity: Yes     Partners: Male     Birth control/protection: None       Review of Systems   Eyes: Positive for blurred vision and photophobia. Negative for double vision. Respiratory: Negative for shortness of breath and wheezing. Cardiovascular: Negative for chest pain and palpitations. Gastrointestinal: Negative for nausea and vomiting. Neurological: Positive for dizziness and headaches. Negative for tingling, tremors, sensory change, seizures and loss of consciousness. Remainder of comprehensive review is negative. Physical Exam :    Visit Vitals  /88   Ht 5' 5\" (1.651 m)   Wt 88.5 kg (195 lb)   LMP 11/14/1992   BMI 32.45 kg/m²       General: Well defined, nourished, and groomed individual in no acute distress.    Neck: Supple, nontender, no bruits, no pain with resistance to active range of motion.    Heart: Regular rate and rhythm, no murmurs, rub, or gallop.  Normal S1S2.  Lungs: Clear to auscultation bilaterally with equal chest expansion, no cough, no wheeze  Musculoskeletal: Extremities revealed no edema and had full range of motion of joints.    Psych: Good mood and bright affect    NEUROLOGICAL EXAMINATION:    Mental Status: Alert and oriented to person, place, and time    Cranial Nerves:    II, III, IV, VI: Visual acuity grossly intact. Visual fields are normal.    Pupils are equal, round, and reactive to light and accommodation.    Extra-ocular movements are full and fluid. Fundoscopic exam was benign, no ptosis or nystagmus.    V-XII: Hearing is grossly intact. Facial features are symmetric, with normal sensation and strength. The palate rises symmetrically and the tongue protrudes midline. Sternocleidomastoids 5/5. Motor Examination: Normal tone, bulk, and strength, 5/5 muscle strength throughout. Coordination: Finger to nose was normal. No resting or intention tremor    Gait and Station: Steady while walking. Normal arm swing. No pronator drift. No muscle wasting or fasiculations noted. Reflexes: DTRs 2+ throughout. Results for orders placed or performed in visit on 12/28/18   CBC WITH AUTOMATED DIFF   Result Value Ref Range    WBC 4.9 3.4 - 10.8 x10E3/uL    RBC 3.58 (L) 3.77 - 5.28 x10E6/uL    HGB 11.0 (L) 11.1 - 15.9 g/dL    HCT 32.1 (L) 34.0 - 46.6 %    MCV 90 79 - 97 fL    MCH 30.7 26.6 - 33.0 pg    MCHC 34.3 31.5 - 35.7 g/dL    RDW 12.7 12.3 - 15.4 %    PLATELET 622 536 - 420 x10E3/uL    NEUTROPHILS 48 Not Estab. %    Lymphocytes 41 Not Estab. %    MONOCYTES 8 Not Estab. %    EOSINOPHILS 2 Not Estab. %    BASOPHILS 1 Not Estab. %    ABS. NEUTROPHILS 2.3 1.4 - 7.0 x10E3/uL    Abs Lymphocytes 2.0 0.7 - 3.1 x10E3/uL    ABS. MONOCYTES 0.4 0.1 - 0.9 x10E3/uL    ABS. EOSINOPHILS 0.1 0.0 - 0.4 x10E3/uL    ABS. BASOPHILS 0.0 0.0 - 0.2 x10E3/uL    IMMATURE GRANULOCYTES 0 Not Estab. %    ABS. IMM.  GRANS. 0.0 0.0 - 0.1 O64C3/RR   METABOLIC PANEL, COMPREHENSIVE   Result Value Ref Range    Glucose 89 65 - 99 mg/dL    BUN 12 6 - 24 mg/dL    Creatinine 0.79 0.57 - 1.00 mg/dL    GFR est non-AA 87 >59 mL/min/1.73    GFR est  >59 mL/min/1.73    BUN/Creatinine ratio 15 9 - 23    Sodium 145 (H) 134 - 144 mmol/L    Potassium 4.4 3.5 - 5.2 mmol/L    Chloride 105 96 - 106 mmol/L    CO2 26 20 - 29 mmol/L    Calcium 9.2 8.7 - 10.2 mg/dL    Protein, total 6.4 6.0 - 8.5 g/dL    Albumin 4.0 3.5 - 5.5 g/dL    GLOBULIN, TOTAL 2.4 1.5 - 4.5 g/dL    A-G Ratio 1.7 1.2 - 2.2    Bilirubin, total 0.3 0.0 - 1.2 mg/dL    Alk. phosphatase 83 39 - 117 IU/L    AST (SGOT) 15 0 - 40 IU/L    ALT (SGPT) 12 0 - 32 IU/L   HEPATIC FUNCTION PANEL   Result Value Ref Range    Bilirubin, direct 0.08 0.00 - 0.40 mg/dL       Orders Placed This Encounter    THER/PROPH/DIAG INJECTION, SUBCUT/IM    MRI BRAIN W WO CONT     Standing Status:   Future     Standing Expiration Date:   2020     Order Specific Question:   Is Patient Allergic to Contrast Dye? Answer:   No     Order Specific Question:   STAT Creatinine as indicated     Answer: Yes    KETOROLAC TROMETHAMINE INJ     Order Specific Question:   Dose     Answer:   60 mg     Order Specific Question:   Site     Answer:   RIGHT GLUTEUS     Order Specific Question:   Expiration Date     Answer:   2020     Order Specific Question:   Lot#     Answer:   [id number]     Order Specific Question:        Answer:   Aurelia Moore     Order Specific Question:   Charge Quantity? Answer:   4     Order Specific Question:   Perfomed by/Witnessed by: Answer:   pardeep worthington     Order Specific Question:   NDC#     Answer:   07519-160-29 [[id number]]    ZOLMitriptan (ZOMIG) 5 mg nasal solution     Sig: by Nasal route once as needed for Migraine.  erenumab-aooe (AIMOVIG AUTOINJECTOR, 2 PACK,) 70 mg/mL atIn     Si mg by SubCUTAneous route every thirty (30) days.  Indications: Migraine Prevention     Dispense:  2 Syringe     Refill:  5  ondansetron hcl (ZOFRAN) 4 mg tablet     Sig: Take 1 Tab by mouth every eight (8) hours as needed for Nausea. Dispense:  30 Tab     Refill:  5       1. Intractable migraine without status migrainosus, unspecified migraine type    2. Visual disturbance        Intractable migraines and visual changes. Need an MRI to rule out stroke, tumor, lesions, masses. IM toradol injection 60 mg tolerated well in office with no concerns and side effects. Has had this before. Keep Aimovig but increase to 140 mg monthly to help with migraine prevention. Consider others if needed. Zofran for rescue of nausea. Try Zipsor for rescue of migraines as can not take triptans. FU after.              This note will not be viewable in [employer]

## 2019-04-02 NOTE — TELEPHONE ENCOUNTER
Called [facility] to see what can be done and agent valdo advised me to submit new request for AIMOVIG 70mg with the updated information to them. She faxed me an Aimovig PA requet form. Completed and faxed back to [phone] with last 2 office notes. Confirmation to chart.

## 2019-04-03 NOTE — TELEPHONE ENCOUNTER
Received fax from [id number] that Joseph Gilbert is authorized until 04/01/20. Fax scanned to chart.

## 2019-11-12 ENCOUNTER — OFFICE VISIT (OUTPATIENT)
Dept: NEUROLOGY | Age: 54
End: 2019-11-12

## 2019-11-12 VITALS
HEIGHT: 65 IN | WEIGHT: 195 LBS | SYSTOLIC BLOOD PRESSURE: 130 MMHG | HEART RATE: 79 BPM | OXYGEN SATURATION: 98 % | BODY MASS INDEX: 32.49 KG/M2 | DIASTOLIC BLOOD PRESSURE: 90 MMHG

## 2019-11-12 DIAGNOSIS — G43.919 INTRACTABLE MIGRAINE WITHOUT STATUS MIGRAINOSUS, UNSPECIFIED MIGRAINE TYPE: Primary | ICD-10-CM

## 2019-11-12 RX ORDER — KETOROLAC TROMETHAMINE 15.75 MG/1
SPRAY, METERED NASAL
Qty: 5 EACH | Refills: 5 | Status: SHIPPED | OUTPATIENT
Start: 2019-11-12 | End: 2020-04-10

## 2019-11-12 NOTE — PROGRESS NOTES
Ioana Elder is a 48 y.o. female who presents with the following  Chief Complaint   Patient presents with    Migraine       HPI    Patient comes in for a follow up for migraines. 140 mg Aimovig has helped drop from about 25 a month to about 8 a month. Zomig nasal spray helps but causes vasoactive side effects. Works well though.      She notes that she has taken a multitude of preventative medications in the past and she has been told by her previous neurologist that he has done everything for her. She has been on preventatives including Depakote Topamax TCAs.      She has had 2 rounds of Botox and has not noticed any changes in her headache frequency before this. She tells me that she is having in an average month 3-4 migraines a week  and that they are lasting 3-4 days.    She is unable to get significant relief. She has also used rizatriptan in the past.        She had difficulty with topiramate making her jittery dizzy and uncomfortable. She had difficulty with Depakote as well.  She failed rizatriptan.    She was also given Benadryl Compazine and Toradol.  Not had any focal deficits with a headache.  She does have significant nausea. She is use Zofran and has not been helpful. She is also tried verapamil.  That was not effective.  She has tried steroids in the past as well. She used zomig and this helped rescue. She was denied cambia. Pina Link denied also. Wants something for rescue mostly.                         Allergies   Allergen Reactions    Sulfa (Sulfonamide Antibiotics) Unknown (comments)     Broke out in a rash in her mouth       Current Outpatient Medications   Medication Sig    ketorolac (SPRIX) 15.75 mg/spray spry 1 spray in both nostrils at HA onset. May repeat every 6 hours PRN    erenumab-aooe (AIMOVIG AUTOINJECTOR) 140 mg/mL injection 1 mL by SubCUTAneous route every thirty (30) days.     ZOLMitriptan (ZOMIG) 5 mg nasal solution by Nasal route once as needed for Migraine.  ondansetron hcl (ZOFRAN) 4 mg tablet Take 1 Tab by mouth every eight (8) hours as needed for Nausea.  estradiol (ESTRACE) 0.5 mg tablet     zolpidem (AMBIEN) 10 mg tablet      No current facility-administered medications for this visit. Social History     Tobacco Use   Smoking Status Never Smoker   Smokeless Tobacco Never Used       Past Medical History:   Diagnosis Date    Migraine        Past Surgical History:   Procedure Laterality Date    HX GYN  2010    hysterectomy       Family History   Problem Relation Age of Onset    Diabetes Mother     Cancer Father        Social History     Socioeconomic History    Marital status:      Spouse name: Not on file    Number of children: Not on file    Years of education: Not on file    Highest education level: Not on file   Tobacco Use    Smoking status: Never Smoker    Smokeless tobacco: Never Used   Substance and Sexual Activity    Alcohol use: Yes     Comment: rarely    Drug use: No    Sexual activity: Yes     Partners: Male     Birth control/protection: None       Review of Systems   Eyes: Positive for blurred vision, double vision and photophobia. Gastrointestinal: Positive for nausea. Negative for vomiting. Neurological: Positive for headaches. Negative for dizziness, tingling and tremors. Remainder of comprehensive review is negative. Physical Exam :    Visit Vitals  /90   Pulse 79   Ht 5' 5\" (1.651 m)   Wt 88.5 kg (195 lb)   LMP 11/14/1992   SpO2 98%   BMI 32.45 kg/m²       General: Well defined, nourished, and groomed individual in no acute distress.    Neck: Supple, nontender, no bruits, no pain with resistance to active range of motion.    Heart: Regular rate and rhythm, no murmurs, rub, or gallop. Normal S1S2.   Lungs: Clear to auscultation bilaterally with equal chest expansion, no cough, no wheeze  Musculoskeletal: Extremities revealed no edema and had full range of motion of joints.    Psych: Good mood and bright affect    NEUROLOGICAL EXAMINATION:    Mental Status: Alert and oriented to person, place, and time    Cranial Nerves:    II, III, IV, VI: Visual acuity grossly intact. Visual fields are normal.    Pupils are equal, round, and reactive to light and accommodation.    Extra-ocular movements are full and fluid. Fundoscopic exam was benign, no ptosis or nystagmus.    V-XII: Hearing is grossly intact. Facial features are symmetric, with normal sensation and strength. The palate rises symmetrically and the tongue protrudes midline. Sternocleidomastoids 5/5. Motor Examination: Normal tone, bulk, and strength, 5/5 muscle strength throughout. Coordination: Finger to nose was normal. No resting or intention tremor    Gait and Station: Steady while walking. Normal arm swing. No pronator drift. No muscle wasting or fasiculations noted. Reflexes: DTRs 2+ throughout. Results for orders placed or performed in visit on 12/28/18   CBC WITH AUTOMATED DIFF   Result Value Ref Range    WBC 4.9 3.4 - 10.8 x10E3/uL    RBC 3.58 (L) 3.77 - 5.28 x10E6/uL    HGB 11.0 (L) 11.1 - 15.9 g/dL    HCT 32.1 (L) 34.0 - 46.6 %    MCV 90 79 - 97 fL    MCH 30.7 26.6 - 33.0 pg    MCHC 34.3 31.5 - 35.7 g/dL    RDW 12.7 12.3 - 15.4 %    PLATELET 559 767 - 068 x10E3/uL    NEUTROPHILS 48 Not Estab. %    Lymphocytes 41 Not Estab. %    MONOCYTES 8 Not Estab. %    EOSINOPHILS 2 Not Estab. %    BASOPHILS 1 Not Estab. %    ABS. NEUTROPHILS 2.3 1.4 - 7.0 x10E3/uL    Abs Lymphocytes 2.0 0.7 - 3.1 x10E3/uL    ABS. MONOCYTES 0.4 0.1 - 0.9 x10E3/uL    ABS. EOSINOPHILS 0.1 0.0 - 0.4 x10E3/uL    ABS. BASOPHILS 0.0 0.0 - 0.2 x10E3/uL    IMMATURE GRANULOCYTES 0 Not Estab. %    ABS. IMM.  GRANS. 0.0 0.0 - 0.1 O92V5/NC   METABOLIC PANEL, COMPREHENSIVE   Result Value Ref Range    Glucose 89 65 - 99 mg/dL    BUN 12 6 - 24 mg/dL    Creatinine 0.79 0.57 - 1.00 mg/dL    GFR est non-AA 87 >59 mL/min/1.73    GFR est  >59 mL/min/1.73 BUN/Creatinine ratio 15 9 - 23    Sodium 145 (H) 134 - 144 mmol/L    Potassium 4.4 3.5 - 5.2 mmol/L    Chloride 105 96 - 106 mmol/L    CO2 26 20 - 29 mmol/L    Calcium 9.2 8.7 - 10.2 mg/dL    Protein, total 6.4 6.0 - 8.5 g/dL    Albumin 4.0 3.5 - 5.5 g/dL    GLOBULIN, TOTAL 2.4 1.5 - 4.5 g/dL    A-G Ratio 1.7 1.2 - 2.2    Bilirubin, total 0.3 0.0 - 1.2 mg/dL    Alk. phosphatase 83 39 - 117 IU/L    AST (SGOT) 15 0 - 40 IU/L    ALT (SGPT) 12 0 - 32 IU/L   HEPATIC FUNCTION PANEL   Result Value Ref Range    Bilirubin, direct 0.08 0.00 - 0.40 mg/dL       Orders Placed This Encounter    ketorolac (SPRIX) 15.75 mg/spray spry     Si spray in both nostrils at HA onset. May repeat every 6 hours PRN     Dispense:  5 Each     Refill:  5       1. Intractable migraine without status migrainosus, unspecified migraine type        migraines. Diane Colder for a few more months at 140 and switch to Emgality if no further changes occur. Try Sprix for PRN rescue as a sensitivity to triptans, failed Cambia, Zipsor. Toradol injections have helped her in the past.   Keep track of symptoms.              This note will not be viewable in MyChart

## 2019-11-12 NOTE — PROGRESS NOTES
ER told her a few months ago not to take any triptans because she felt like she was having a heart attack. zomig nasal spray is the only thing that helps her headache but does cause tightness in her throat and burning in her head and dizziness. States she tries to tolerate it.

## 2019-12-06 RX ORDER — ERENUMAB-AOOE 140 MG/ML
INJECTION, SOLUTION SUBCUTANEOUS
Qty: 1 SYRINGE | Refills: 17 | Status: SHIPPED | OUTPATIENT
Start: 2019-12-06 | End: 2020-12-16

## 2020-01-23 RX ORDER — ZOLMITRIPTAN 5 MG/1
SPRAY NASAL
Qty: 6 CONTAINER | Refills: 5 | Status: SHIPPED | OUTPATIENT
Start: 2020-01-23 | End: 2021-09-20 | Stop reason: SDUPTHER

## 2020-04-10 ENCOUNTER — VIRTUAL VISIT (OUTPATIENT)
Dept: NEUROLOGY | Age: 55
End: 2020-04-10

## 2020-04-10 VITALS — HEIGHT: 65 IN | BODY MASS INDEX: 32.45 KG/M2

## 2020-04-10 DIAGNOSIS — G43.919 INTRACTABLE MIGRAINE WITHOUT STATUS MIGRAINOSUS, UNSPECIFIED MIGRAINE TYPE: Primary | ICD-10-CM

## 2020-04-10 NOTE — PROGRESS NOTES
nPt states she is still getting the headaches but not as intense. Pt states they are more manageable. Pt has also been taking a lot of BC powders and wanted to know if this is safe. Pt states she has a spot on her leg where she gives her monthly injection.

## 2020-04-10 NOTE — PROGRESS NOTES
Consent: Keyona Santos, who was seen by synchronous (real-time) audio-video technology, and/or her healthcare decision maker, is aware that this patient-initiated, Telehealth encounter on 4/10/2020 is a billable service, with coverage as determined by her insurance carrier. She is aware that she may receive a bill and has provided verbal consent to proceed: Yes.           Patient comes in for a follow up for migraines VIA virtal visit. 140 mg Aimovig has helped drop from about 25 a month to about 8 a month. Zomig nasal spray helps but causes vasoactive side effects and she feels terrible. Can not take this very often due tot his.      She notes that she has taken a multitude of preventative medications in the past and she has been told by her previous neurologist that he has done everything for her. She has been on preventatives including Depakote Topamax TCAs.         She has had 2 rounds of Botox and has not noticed any changes in her headache frequency before this.       She has also used rizatriptan in the past.  Zomig failed. Sprix caused side effects.          She had difficulty with topiramate making her jittery dizzy and uncomfortable. She had difficulty with Depakote as well.  She failed rizatriptan.    She was also given Benadryl Compazine and Toradol.  Not had any focal deficits with a headache.  She does have significant nausea. She is use Zofran and has not been helpful. She is also tried verapamil.  That was not effective.  She has tried steroids in the past as well.     She used zomig and this helped rescue. She was denied cambia. Anuradha Rich denied also. Still working. Someone at work was diagnosed with COVID So she is heightened right now.        Assessment & Plan:           I spent at least 25 minutes with this established patient, and >50% of the time was spent counseling and/or coordinating care regarding meds, treatment   712  Subjective:   Keyona Santos is a 50 y.o. female who was seen for Follow-up and Migraine      Prior to Admission medications    Medication Sig Start Date End Date Taking? Authorizing Provider   ubrogepant Xavier Jacks) 50 mg tablet 1 at HA onset and repeat in 2 hours if needed. Max 2 in 24 hours 4/10/20  Yes O'Laughlin, Clydie Epley, NP   ZOLMitriptan (ZOMIG) 5 mg nasal solution 1 spray in 1 nostril at HA onset. May repeat in 2 hours X 1 dose. Max 2 doses in 24 hours. 1/23/20  Yes O'Laughlin, Clydie Epley, NP   AIMOVIG AUTOINJECTOR 140 mg/mL injection INJECT 140 MG (1 SYRINGE) SUBCUTANEOUSLY EVERY 30 DAYS FOR MIGRAINE PREVENTION. 12/6/19  Yes O'Laughlin, Clydie Epley, NP   ondansetron hcl (ZOFRAN) 4 mg tablet Take 1 Tab by mouth every eight (8) hours as needed for Nausea. 3/29/19  Yes O'Laughlin, Clydie Epley, NP   estradiol (ESTRACE) 0.5 mg tablet  5/13/18  Yes Provider, Historical   zolpidem (AMBIEN) 10 mg tablet  8/3/18  Yes Provider, Historical   ketorolac (SPRIX) 15.75 mg/spray spry 1 spray in both nostrils at HA onset. May repeat every 6 hours PRN 11/12/19 4/10/20  O'Laughlin, Clydie Epley, NP     Allergies   Allergen Reactions    Sulfa (Sulfonamide Antibiotics) Unknown (comments)     Broke out in a rash in her mouth       Patient Active Problem List   Diagnosis Code    Migraine G43.909    Surgical menopause E89.40    Intractable migraine without aura and with status migrainosus G43.011     Patient Active Problem List    Diagnosis Date Noted    Intractable migraine without aura and with status migrainosus 08/17/2018    Surgical menopause 12/19/2012    Migraine 09/20/2012     Current Outpatient Medications   Medication Sig Dispense Refill    ubrogepant (Ubrelvy) 50 mg tablet 1 at HA onset and repeat in 2 hours if needed. Max 2 in 24 hours 10 Tab 5    ZOLMitriptan (ZOMIG) 5 mg nasal solution 1 spray in 1 nostril at HA onset. May repeat in 2 hours X 1 dose. Max 2 doses in 24 hours.  6 Container 5    AIMOVIG AUTOINJECTOR 140 mg/mL injection INJECT 140 MG (1 SYRINGE) SUBCUTANEOUSLY EVERY 30 DAYS FOR MIGRAINE PREVENTION. 1 Syringe 17    ondansetron hcl (ZOFRAN) 4 mg tablet Take 1 Tab by mouth every eight (8) hours as needed for Nausea. 30 Tab 5    estradiol (ESTRACE) 0.5 mg tablet       zolpidem (AMBIEN) 10 mg tablet        Allergies   Allergen Reactions    Sulfa (Sulfonamide Antibiotics) Unknown (comments)     Broke out in a rash in her mouth     Past Medical History:   Diagnosis Date    Migraine      Past Surgical History:   Procedure Laterality Date    HX GYN  2010    hysterectomy     Family History   Problem Relation Age of Onset    Diabetes Mother     Cancer Father      Social History     Tobacco Use    Smoking status: Never Smoker    Smokeless tobacco: Never Used   Substance Use Topics    Alcohol use: Yes     Comment: rarely       Review of Systems   Eyes: Positive for blurred vision, double vision and photophobia. Gastrointestinal: Positive for nausea. Negative for vomiting. Neurological: Positive for headaches. Negative for dizziness, tingling, tremors, sensory change and speech change.            Objective:   Vital Signs: (As obtained by patient/caregiver at home)  Visit Vitals   5' 5\" (1.651 m)   [facility] 11/14/1992   BMI 32.45 kg/m²        [INSTRUCTIONS:  \"[x]\" Indicates a positive item  \"[]\" Indicates a negative item  -- DELETE ALL ITEMS NOT EXAMINED]    Constitutional: [x] Appears well-developed and well-nourished [x] No apparent distress      [] Abnormal -     Mental status: [x] Alert and awake  [x] Oriented to person/place/time [x] Able to follow commands    [] Abnormal -     Eyes:   EOM    [x]  Normal    [] Abnormal -   Sclera  [x]  Normal    [] Abnormal -          Discharge [x]  None visible   [] Abnormal -     HENT: [x] Normocephalic, atraumatic  [] Abnormal -   [x] Mouth/Throat: Mucous membranes are moist    External Ears [x] Normal  [] Abnormal -    Neck: [x] No visualized mass [] Abnormal -     Pulmonary/Chest: [x] Respiratory effort normal   [x] No visualized signs of difficulty breathing or respiratory distress        [] Abnormal -      Musculoskeletal:   [x] Normal gait with no signs of ataxia         [x] Normal range of motion of neck        [] Abnormal -     Neurological:        [x] No Facial Asymmetry (Cranial nerve 7 motor function) (limited exam due to video visit)          [x] No gaze palsy        [] Abnormal -          Skin:        [x] No significant exanthematous lesions or discoloration noted on facial skin         [] Abnormal -            Psychiatric:       [x] Normal Affect [] Abnormal -        [x] No Hallucinations    Other pertinent observable physical exam findings:-    Keep Aimovig 140 mg every 30 days for FDA Indicated and approved prevention of migraines. Keep using BC powder if needed- she is not overusing, 1-2 every 1-2 weeks per report. Try Ubrelvy 50 mg tablets for PRN rescue of migraines. Failed multiple rescues as seen above and is UNABLE To take triptans. We discussed the expected course, resolution and complications of the diagnosis(es) in detail. Medication risks, benefits, costs, interactions, and alternatives were discussed as indicated. I advised her to contact the office if her condition worsens, changes or fails to improve as anticipated. She expressed understanding with the diagnosis(es) and plan. Linda Krishnamurthy is a 50 y.o. female being evaluated by a video visit encounter for concerns as above. A caregiver was present when appropriate. Due to this being a [facility]Health encounter ([facility]), evaluation of the following organ systems was limited: Vitals/Constitutional/EENT/Resp/CV/GI//MS/Neuro/Skin/Heme-Lymph-Imm.   Pursuant to the emergency declaration under the [facility], [facility] waiver authority and the [employer] Act, this Virtual  Visit was conducted, with patient's (and/or legal guardian's) consent, to reduce the patient's risk of exposure to COVID-19 and provide necessary medical care. Services were provided through a video synchronous discussion virtually to substitute for in-person clinic visit. Patient and provider were located at their individual homes.         Ham Mansfield NP

## 2020-04-16 RX ORDER — ERENUMAB-AOOE 140 MG/ML
140 INJECTION, SOLUTION SUBCUTANEOUS ONCE
Qty: 1 EACH | Refills: 0 | Status: SHIPPED | COMMUNITY
Start: 2020-04-16 | End: 2020-04-16

## 2020-08-20 ENCOUNTER — TELEPHONE (OUTPATIENT)
Dept: NEUROLOGY | Age: 55
End: 2020-08-20

## 2020-08-24 ENCOUNTER — VIRTUAL VISIT (OUTPATIENT)
Dept: NEUROLOGY | Age: 55
End: 2020-08-24
Payer: COMMERCIAL

## 2020-08-24 DIAGNOSIS — G43.909 MIGRAINE WITHOUT STATUS MIGRAINOSUS, NOT INTRACTABLE, UNSPECIFIED MIGRAINE TYPE: Primary | ICD-10-CM

## 2020-08-24 PROCEDURE — 99213 OFFICE O/P EST LOW 20 MIN: CPT | Performed by: NURSE PRACTITIONER

## 2020-08-24 NOTE — PROGRESS NOTES
Misbah Mina is a 50 y.o. female who was seen by synchronous (real-time) audio-video technology on 8/24/2020 for Follow-up and Migraine ( migraines are better but she is still getting the up to 3x a month)        FU virtually for migraines. Having about 3 a month now. Using Sprix for rescue. Does make her feel hot and flushed but works with just 1 spray now. Ubrelvy did not help. Nothing else has really helped either. She is not having any new symptoms or new changes in HA. She is interested in medical marijuana and has done some research. Robinsoncelsa Niki is working for prevention. Assessment & Plan:   Diagnoses and all orders for this visit:    1. Migraine without status migrainosus, not intractable, unspecified migraine type            Subjective:       Prior to Admission medications    Medication Sig Start Date End Date Taking? Authorizing Provider   ZOLMitriptan (ZOMIG) 5 mg nasal solution 1 spray in 1 nostril at HA onset. May repeat in 2 hours X 1 dose. Max 2 doses in 24 hours. 1/23/20  Yes John Mack NP   AIMOVIG AUTOINJECTOR 140 mg/mL injection INJECT 140 MG (1 SYRINGE) SUBCUTANEOUSLY EVERY 30 DAYS FOR MIGRAINE PREVENTION. 12/6/19  Yes John Mack NP   ondansetron hcl (ZOFRAN) 4 mg tablet Take 1 Tab by mouth every eight (8) hours as needed for Nausea. 3/29/19  Yes John Mack NP   estradiol (ESTRACE) 0.5 mg tablet  5/13/18  Yes Provider, Historical   zolpidem (AMBIEN) 10 mg tablet  8/3/18  Yes Provider, Historical   ubrogepant Lenn Heap) 50 mg tablet 1 at HA onset and repeat in 2 hours if needed.   Max 2 in 24 hours 5/6/20   John Mack NP     Patient Active Problem List   Diagnosis Code    Migraine G43.909    Surgical menopause E89.40    Intractable migraine without aura and with status migrainosus G43.011     Patient Active Problem List    Diagnosis Date Noted    Intractable migraine without aura and with status migrainosus 08/17/2018    Surgical menopause 12/19/2012    Migraine 09/20/2012     Current Outpatient Medications   Medication Sig Dispense Refill    ZOLMitriptan (ZOMIG) 5 mg nasal solution 1 spray in 1 nostril at HA onset. May repeat in 2 hours X 1 dose. Max 2 doses in 24 hours. 6 Container 5    AIMOVIG AUTOINJECTOR 140 mg/mL injection INJECT 140 MG (1 SYRINGE) SUBCUTANEOUSLY EVERY 30 DAYS FOR MIGRAINE PREVENTION. 1 Syringe 17    ondansetron hcl (ZOFRAN) 4 mg tablet Take 1 Tab by mouth every eight (8) hours as needed for Nausea. 30 Tab 5    estradiol (ESTRACE) 0.5 mg tablet       zolpidem (AMBIEN) 10 mg tablet       ubrogepant (Ubrelvy) 50 mg tablet 1 at HA onset and repeat in 2 hours if needed. Max 2 in 24 hours 10 Tab 5     Allergies   Allergen Reactions    Sulfa (Sulfonamide Antibiotics) Unknown (comments)     Broke out in a rash in her mouth     Past Medical History:   Diagnosis Date    Migraine      Past Surgical History:   Procedure Laterality Date    HX GYN  2010    hysterectomy     Family History   Problem Relation Age of Onset    Diabetes Mother     Cancer Father      Social History     Tobacco Use    Smoking status: Never Smoker    Smokeless tobacco: Never Used   Substance Use Topics    Alcohol use: Yes     Comment: rarely       Review of Systems   Eyes: Positive for blurred vision and photophobia. Gastrointestinal: Negative for nausea and vomiting. Neurological: Positive for headaches. Negative for dizziness, tingling, tremors and sensory change. Objective:   No flowsheet data found.      [INSTRUCTIONS:  \"[x]\" Indicates a positive item  \"[]\" Indicates a negative item  -- DELETE ALL ITEMS NOT EXAMINED]    Constitutional: [x] Appears well-developed and well-nourished [x] No apparent distress      [] Abnormal -     Mental status: [x] Alert and awake  [x] Oriented to person/place/time [x] Able to follow commands    [] Abnormal -     Eyes:   EOM    [x]  Normal    [] Abnormal -   Sclera  [x]  Normal    [] Abnormal - Discharge [x]  None visible   [] Abnormal -     HENT: [x] Normocephalic, atraumatic  [] Abnormal -   [x] Mouth/Throat: Mucous membranes are moist    External Ears [x] Normal  [] Abnormal -    Neck: [x] No visualized mass [] Abnormal -     Pulmonary/Chest: [x] Respiratory effort normal   [x] No visualized signs of difficulty breathing or respiratory distress        [] Abnormal -      Musculoskeletal:   [x] Normal gait with no signs of ataxia         [x] Normal range of motion of neck        [] Abnormal -     Neurological:        [x] No Facial Asymmetry (Cranial nerve 7 motor function) (limited exam due to video visit)          [x] No gaze palsy        [] Abnormal -          Skin:        [x] No significant exanthematous lesions or discoloration noted on facial skin         [] Abnormal -            Psychiatric:       [x] Normal Affect [] Abnormal -        [x] No Hallucinations    Other pertinent observable physical exam findings:-      Discussed treatment. KEep Aimovig for prevention of migraine. Continue Sprix PRN as she is not using very much   [facility] does not participate in medical marijuana so will have to get in with a pain specialist that can help with this and she was notified. We discussed the expected course, resolution and complications of the diagnosis(es) in detail. Medication risks, benefits, costs, interactions, and alternatives were discussed as indicated. I advised her to contact the office if her condition worsens, changes or fails to improve as anticipated. She expressed understanding with the diagnosis(es) and plan. Madiha Elliott, who was evaluated through a patient-initiated, synchronous (real-time) audio-video encounter, and/or her healthcare decision maker, is aware that it is a billable service, with coverage as determined by her insurance carrier. She provided verbal consent to proceed: Yes, and patient identification was verified.  It was conducted pursuant to the emergency declaration under the [facility], [address] [place] [address] authority and the [employer] and [employer] Act. A caregiver was present when appropriate. Ability to conduct physical exam was limited. I was in the office. The patient was at home.       Bernardo Fountain NP

## 2020-11-30 ENCOUNTER — VIRTUAL VISIT (OUTPATIENT)
Dept: NEUROLOGY | Age: 55
End: 2020-11-30
Payer: COMMERCIAL

## 2020-11-30 DIAGNOSIS — G43.919 INTRACTABLE MIGRAINE WITHOUT STATUS MIGRAINOSUS, UNSPECIFIED MIGRAINE TYPE: Primary | ICD-10-CM

## 2020-11-30 PROCEDURE — 99213 OFFICE O/P EST LOW 20 MIN: CPT | Performed by: NURSE PRACTITIONER

## 2020-11-30 NOTE — PROGRESS NOTES
Ruiz Yarbrough is a 50 y.o. female who was seen by synchronous (real-time) audio-video technology on 11/30/2020 for Follow-up and Migraine      FU virtually for migraines. Having about 2 a month now. Only lasting 30 minutes with Zomig and gone. Not as painful, not as intense. Not lasting as long. Rebekah Landis is working well for prevention for her. Sleeping well. Ubrelvy did not help. Nothing else has really helped either. She is not having any new symptoms or new changes in HA. Assessment & Plan:       Subjective:       Prior to Admission medications    Medication Sig Start Date End Date Taking? Authorizing Provider   ZOLMitriptan (ZOMIG) 5 mg nasal solution 1 spray in 1 nostril at HA onset. May repeat in 2 hours X 1 dose. Max 2 doses in 24 hours. 1/23/20  Yes Jacquie Mack NP   AIMOVIG AUTOINJECTOR 140 mg/mL injection INJECT 140 MG (1 SYRINGE) SUBCUTANEOUSLY EVERY 30 DAYS FOR MIGRAINE PREVENTION. 12/6/19  Yes Jacquie Mack NP   ondansetron hcl (ZOFRAN) 4 mg tablet Take 1 Tab by mouth every eight (8) hours as needed for Nausea. 3/29/19  Yes Jacquie Mack NP   estradiol (ESTRACE) 0.5 mg tablet  5/13/18  Yes Provider, Historical   zolpidem (AMBIEN) 10 mg tablet  8/3/18  Yes Provider, Historical   ubrogepant Brena Loan) 50 mg tablet 1 at HA onset and repeat in 2 hours if needed. Max 2 in 24 hours 5/6/20   Jacquie Mack NP     Patient Active Problem List   Diagnosis Code    Migraine G43.909    Surgical menopause E89.40    Intractable migraine without aura and with status migrainosus G43.011     Patient Active Problem List    Diagnosis Date Noted    Intractable migraine without aura and with status migrainosus 08/17/2018    Surgical menopause 12/19/2012    Migraine 09/20/2012     Current Outpatient Medications   Medication Sig Dispense Refill    ZOLMitriptan (ZOMIG) 5 mg nasal solution 1 spray in 1 nostril at HA onset. May repeat in 2 hours X 1 dose. Max 2 doses in 24 hours. 6 Container 5    AIMOVIG AUTOINJECTOR 140 mg/mL injection INJECT 140 MG (1 SYRINGE) SUBCUTANEOUSLY EVERY 30 DAYS FOR MIGRAINE PREVENTION. 1 Syringe 17    ondansetron hcl (ZOFRAN) 4 mg tablet Take 1 Tab by mouth every eight (8) hours as needed for Nausea. 30 Tab 5    estradiol (ESTRACE) 0.5 mg tablet       zolpidem (AMBIEN) 10 mg tablet       ubrogepant (Ubrelvy) 50 mg tablet 1 at HA onset and repeat in 2 hours if needed. Max 2 in 24 hours 10 Tab 5     Allergies   Allergen Reactions    Sulfa (Sulfonamide Antibiotics) Unknown (comments)     Broke out in a rash in her mouth     Past Medical History:   Diagnosis Date    Migraine      Past Surgical History:   Procedure Laterality Date    HX GYN  2010    hysterectomy     Family History   Problem Relation Age of Onset    Diabetes Mother     Cancer Father      Social History     Tobacco Use    Smoking status: Never Smoker    Smokeless tobacco: Never Used   Substance Use Topics    Alcohol use: Yes     Comment: rarely       Review of Systems   Eyes: Positive for blurred vision and photophobia. Negative for double vision. Respiratory: Negative for shortness of breath and wheezing. Cardiovascular: Negative for chest pain and palpitations. Gastrointestinal: Positive for nausea. Negative for vomiting. Neurological: Positive for headaches. Negative for dizziness, tingling, tremors, sensory change and seizures. Objective:   No flowsheet data found.      [INSTRUCTIONS:  \"[x]\" Indicates a positive item  \"[]\" Indicates a negative item  -- DELETE ALL ITEMS NOT EXAMINED]    Constitutional: [x] Appears well-developed and well-nourished [x] No apparent distress      [] Abnormal -     Mental status: [x] Alert and awake  [x] Oriented to person/place/time [x] Able to follow commands    [] Abnormal -     Eyes:   EOM    [x]  Normal    [] Abnormal -   Sclera  [x]  Normal    [] Abnormal -          Discharge [x]  None visible   [] Abnormal -     HENT: [x] Normocephalic, atraumatic  [] Abnormal -   [x] Mouth/Throat: Mucous membranes are moist    External Ears [x] Normal  [] Abnormal -    Neck: [x] No visualized mass [] Abnormal -     Pulmonary/Chest: [x] Respiratory effort normal   [x] No visualized signs of difficulty breathing or respiratory distress        [] Abnormal -      Musculoskeletal:   [x] Normal gait with no signs of ataxia         [x] Normal range of motion of neck        [] Abnormal -     Neurological:        [x] No Facial Asymmetry (Cranial nerve 7 motor function) (limited exam due to video visit)          [x] No gaze palsy        [] Abnormal -          Skin:        [x] No significant exanthematous lesions or discoloration noted on facial skin         [] Abnormal -            Psychiatric:       [x] Normal Affect [] Abnormal -        [x] No Hallucinations    Other pertinent observable physical exam findings:-    FU for migraines. Doing well. 2 a month. Happy with this. zomig works for PRN rescue. No other issues. We discussed the expected course, resolution and complications of the diagnosis(es) in detail. Medication risks, benefits, costs, interactions, and alternatives were discussed as indicated. I advised her to contact the office if her condition worsens, changes or fails to improve as anticipated. She expressed understanding with the diagnosis(es) and plan. Hayley Parekh, who was evaluated through a patient-initiated, synchronous (real-time) audio-video encounter, and/or her healthcare decision maker, is aware that it is a billable service, with coverage as determined by her insurance carrier. She provided verbal consent to proceed: Yes, and patient identification was verified. It was conducted pursuant to the emergency declaration under the [address] [place] [address], [address] [place] [address] and the [employer] and [employer] General Act.  A caregiver was present when appropriate. Ability to conduct physical exam was limited. I was in the office. The patient was at home.       Sandra Nieto NP

## 2020-12-16 RX ORDER — ERENUMAB-AOOE 140 MG/ML
INJECTION, SOLUTION SUBCUTANEOUS
Qty: 1 ML | Refills: 0 | Status: SHIPPED | OUTPATIENT
Start: 2020-12-16 | End: 2021-01-18

## 2021-01-18 RX ORDER — ERENUMAB-AOOE 140 MG/ML
INJECTION, SOLUTION SUBCUTANEOUS
Qty: 1 ML | Refills: 0 | Status: SHIPPED | OUTPATIENT
Start: 2021-01-18 | End: 2021-02-16 | Stop reason: SDUPTHER

## 2021-02-16 ENCOUNTER — TELEPHONE (OUTPATIENT)
Dept: NEUROLOGY | Age: 56
End: 2021-02-16

## 2021-02-16 RX ORDER — ERENUMAB-AOOE 140 MG/ML
INJECTION, SOLUTION SUBCUTANEOUS
Qty: 1 ML | Refills: 5 | Status: SHIPPED | OUTPATIENT
Start: 2021-02-16 | End: 2021-02-22

## 2021-02-16 NOTE — TELEPHONE ENCOUNTER
----- Message from Pradeep Lindsay sent at 2/16/2021  8:41 AM EST -----  Regarding: Np [place]/Refill  Medication Refill    Caller (if not patient):      Relationship of caller (if not patient):      Best contact number(s):[phone]      Name of medication and dosage if known: Joan Hanson new prescription needed      Is patient out of this medication (yes/no): yes      Pharmacy name: [facility] listed in chart? (yes/no):yes  Pharmacy phone number:      Details to clarify the request:      Pradeep Lindsay

## 2021-02-22 RX ORDER — ERENUMAB-AOOE 140 MG/ML
INJECTION, SOLUTION SUBCUTANEOUS
Qty: 1 ML | Refills: 0 | Status: SHIPPED | OUTPATIENT
Start: 2021-02-22 | End: 2021-03-19 | Stop reason: SDUPTHER

## 2021-03-19 RX ORDER — ERENUMAB-AOOE 140 MG/ML
INJECTION, SOLUTION SUBCUTANEOUS
Qty: 1 ML | Refills: 5 | Status: SHIPPED | OUTPATIENT
Start: 2021-03-19 | End: 2021-03-22

## 2021-03-22 RX ORDER — ERENUMAB-AOOE 140 MG/ML
INJECTION, SOLUTION SUBCUTANEOUS
Qty: 1 ML | Refills: 0 | Status: SHIPPED | OUTPATIENT
Start: 2021-03-22 | End: 2021-04-26

## 2021-04-26 RX ORDER — ERENUMAB-AOOE 140 MG/ML
INJECTION, SOLUTION SUBCUTANEOUS
Qty: 1 ML | Refills: 0 | Status: SHIPPED | OUTPATIENT
Start: 2021-04-26 | End: 2021-05-25

## 2021-04-28 ENCOUNTER — TELEPHONE (OUTPATIENT)
Dept: NEUROLOGY | Age: 56
End: 2021-04-28

## 2021-04-28 ENCOUNTER — TRANSCRIBE ORDER (OUTPATIENT)
Dept: INTERNAL MEDICINE CLINIC | Age: 56
End: 2021-04-28

## 2021-04-28 NOTE — TELEPHONE ENCOUNTER
----- Message from Anibal Levin Page sent at 4/28/2021  2:21 PM EDT -----  Regarding: Dr. Raheem Olea (if not patient):n/a        Relationship of caller (if not patient): n/a      Best contact number(s): [phone]      Name of medication and dosage if known: Katerine Topete      Is patient out of this medication (yes/no): Yes      Pharmacy name[facility] listed in chart? (yes/no): Yes  Pharmacy phone number:      Details to clarify the request: Follow up request      Ofelia Whipple

## 2021-05-25 RX ORDER — ERENUMAB-AOOE 140 MG/ML
INJECTION, SOLUTION SUBCUTANEOUS
Qty: 1 ML | Refills: 0 | Status: SHIPPED | OUTPATIENT
Start: 2021-05-25 | End: 2021-06-18 | Stop reason: ALTCHOICE

## 2021-05-27 RX ORDER — ERENUMAB-AOOE 140 MG/ML
INJECTION, SOLUTION SUBCUTANEOUS
Qty: 1 ML | Refills: 5 | Status: SHIPPED | OUTPATIENT
Start: 2021-05-27 | End: 2021-09-20 | Stop reason: SDUPTHER

## 2021-06-18 ENCOUNTER — OFFICE VISIT (OUTPATIENT)
Dept: NEUROLOGY | Age: 56
End: 2021-06-18
Payer: COMMERCIAL

## 2021-06-18 VITALS
BODY MASS INDEX: 27.8 KG/M2 | RESPIRATION RATE: 16 BRPM | HEIGHT: 66 IN | HEART RATE: 74 BPM | SYSTOLIC BLOOD PRESSURE: 124 MMHG | DIASTOLIC BLOOD PRESSURE: 80 MMHG | OXYGEN SATURATION: 100 % | WEIGHT: 173 LBS

## 2021-06-18 DIAGNOSIS — G43.919 INTRACTABLE MIGRAINE WITHOUT STATUS MIGRAINOSUS, UNSPECIFIED MIGRAINE TYPE: Primary | ICD-10-CM

## 2021-06-18 DIAGNOSIS — R20.2 PARESTHESIA: ICD-10-CM

## 2021-06-18 DIAGNOSIS — G93.5 CHIARI I MALFORMATION (HCC): ICD-10-CM

## 2021-06-18 PROCEDURE — 99215 OFFICE O/P EST HI 40 MIN: CPT | Performed by: NURSE PRACTITIONER

## 2021-06-18 RX ORDER — RIMEGEPANT SULFATE 75 MG/75MG
TABLET, ORALLY DISINTEGRATING ORAL
Qty: 8 TABLET | Refills: 5 | Status: SHIPPED | OUTPATIENT
Start: 2021-06-18 | End: 2021-12-10 | Stop reason: ALTCHOICE

## 2021-06-18 NOTE — PROGRESS NOTES
Thomas Davila is a 49 y.o. female who presents with the following  Chief Complaint   Patient presents with    Follow-up    Migraine     patient complains of migraines she had 2 this week and none last week. HPI     FU for migraines. Worsening due to insurance issues with Aimovig 140 mg and has been off X 1 month now   We do have an approval until 2022 and will check into this further. She was doing well but recently more frequent due to treatment pause. Taking OTC excedrin with no results. Has noticed triptans, ubrelvy did not help   Never used Nurtec. She is having about 2 a week now average   More then when on Aimovig. They are located unilaterally. Sharp, stabbing pain   Light and sound sensitive. Nausea also. She is having neck pain and some paresthesia in arms also   She has not had any recent imaging   Looking at her 2012 MRI chairi 1 and never knew she had this. She does feel like when migraine comes on she is slower, debilitated at times. Sleep is normal. Stress is high but the same as usual.         Allergies   Allergen Reactions    Sulfa (Sulfonamide Antibiotics) Unknown (comments)     Broke out in a rash in her mouth       Current Outpatient Medications   Medication Sig    rimegepant (Nurtec ODT) 75 mg disintegrating tablet Take 1 tablet at HA onset PRN. Max 1 tablet in 24 hours.  erenumab-aooe (Aimovig Autoinjector) 140 mg/mL injection INJECT 1 SYRINGE ONCE EVERY MONTH FOR MIGRAINE PREVENTION    ZOLMitriptan (ZOMIG) 5 mg nasal solution 1 spray in 1 nostril at HA onset. May repeat in 2 hours X 1 dose. Max 2 doses in 24 hours.  estradiol (ESTRACE) 0.5 mg tablet     zolpidem (AMBIEN) 10 mg tablet      No current facility-administered medications for this visit.        Social History     Tobacco Use   Smoking Status Never Smoker   Smokeless Tobacco Never Used       Past Medical History:   Diagnosis Date    Migraine        Past Surgical History:   Procedure Laterality Date    HX GYN  2010    hysterectomy       Family History   Problem Relation Age of Onset    Diabetes Mother     Cancer Father        Social History     Socioeconomic History    Marital status:      Spouse name: Not on file    Number of children: Not on file    Years of education: Not on file    Highest education level: Not on file   Tobacco Use    Smoking status: Never Smoker    Smokeless tobacco: Never Used   Substance and Sexual Activity    Alcohol use: Yes     Comment: rarely    Drug use: No    Sexual activity: Yes     Partners: Male     Birth control/protection: None     Social Determinants of Health     Financial Resource Strain:     Difficulty of Paying Living Expenses:    Food Insecurity:     Worried About Running Out of Food in the Last Year:     [address] in the Last Year:    Transportation Needs:     Lack of Transportation (Medical):  Lack of Transportation (Non-Medical):    Physical Activity:     Days of Exercise per Week:     Minutes of Exercise per Session:    Stress:     Feeling of Stress :    Social Connections:     Frequency of Communication with Friends and Family:     Frequency of Social Gatherings with Friends and Family:     Attends Congregational Services:     Active Member of Clubs or Organizations:     Attends Club or Organization Meetings:     Marital Status:        Review of Systems   Eyes: Positive for blurred vision and photophobia. Negative for double vision. Respiratory: Negative for shortness of breath and wheezing. Cardiovascular: Negative for chest pain and palpitations. Gastrointestinal: Positive for nausea and vomiting. Musculoskeletal: Positive for neck pain. Neurological: Positive for dizziness, tingling, sensory change, speech change and headaches. Negative for tremors, seizures and loss of consciousness. Remainder of comprehensive review is negative.      Physical Exam :    Visit Vitals  /80 (BP 1 Location: Left upper arm, BP Patient Position: Sitting)   Pulse 74   Resp 16   Ht 5' 6\" (1.676 m)   Wt 78.5 kg (173 lb)   LMP 11/14/1992   SpO2 100%   BMI 27.92 kg/m²       General: Well defined, nourished, and groomed individual in no acute distress.    Neck: pain with flexion, extension, resistance side to side.    Musculoskeletal: Extremities revealed no edema and had full range of motion of joints.    Psych: Good mood and bright affect    NEUROLOGICAL EXAMINATION:    Mental Status: Alert and oriented to person, place, and time    Cranial Nerves:    II, III, IV, VI: Visual acuity grossly intact. Visual fields are normal.    Pupils are equal, round, and reactive to light and accommodation.    Extra-ocular movements are full and fluid. Fundoscopic exam was benign, no ptosis or nystagmus.    V-XII: Hearing is grossly intact. Facial features are symmetric, with normal sensation and strength. The palate rises symmetrically and the tongue protrudes midline. Sternocleidomastoids 5/5. Motor Examination: Normal tone, bulk, and strength, 4/5 muscle strength throughout. Coordination: Finger to nose was normal. No resting or intention tremor    Gait and Station: Steady while walking. Normal arm swing. No pronator drift. No muscle wasting or fasiculations noted. Reflexes: DTRs 2+ throughout. Results for orders placed or performed in visit on 12/28/18   CBC WITH AUTOMATED DIFF   Result Value Ref Range    WBC 4.9 3.4 - 10.8 x10E3/uL    RBC 3.58 (L) 3.77 - 5.28 x10E6/uL    HGB 11.0 (L) 11.1 - 15.9 g/dL    HCT 32.1 (L) 34.0 - 46.6 %    MCV 90 79 - 97 fL    MCH 30.7 26.6 - 33.0 pg    MCHC 34.3 31.5 - 35.7 g/dL    RDW 12.7 12.3 - 15.4 %    PLATELET 648 095 - 490 x10E3/uL    NEUTROPHILS 48 Not Estab. %    Lymphocytes 41 Not Estab. %    MONOCYTES 8 Not Estab. %    EOSINOPHILS 2 Not Estab. %    BASOPHILS 1 Not Estab. %    ABS. NEUTROPHILS 2.3 1.4 - 7.0 x10E3/uL    Abs Lymphocytes 2.0 0.7 - 3.1 x10E3/uL    ABS.  MONOCYTES 0.4 0.1 - 0.9 x10E3/uL    ABS. EOSINOPHILS 0.1 0.0 - 0.4 x10E3/uL    ABS. BASOPHILS 0.0 0.0 - 0.2 x10E3/uL    IMMATURE GRANULOCYTES 0 Not Estab. %    ABS. IMM. GRANS. 0.0 0.0 - 0.1 [id number]   METABOLIC PANEL, COMPREHENSIVE   Result Value Ref Range    Glucose 89 65 - 99 mg/dL    BUN 12 6 - 24 mg/dL    Creatinine 0.79 0.57 - 1.00 mg/dL    GFR est non-AA 87 >59 mL/min/1.73    GFR est  >59 mL/min/1.73    BUN/Creatinine ratio 15 9 - 23    Sodium 145 (H) 134 - 144 mmol/L    Potassium 4.4 3.5 - 5.2 mmol/L    Chloride 105 96 - 106 mmol/L    CO2 26 20 - 29 mmol/L    Calcium 9.2 8.7 - 10.2 mg/dL    Protein, total 6.4 6.0 - 8.5 g/dL    Albumin 4.0 3.5 - 5.5 g/dL    GLOBULIN, TOTAL 2.4 1.5 - 4.5 g/dL    A-G Ratio 1.7 1.2 - 2.2    Bilirubin, total 0.3 0.0 - 1.2 mg/dL    Alk. phosphatase 83 39 - 117 IU/L    AST (SGOT) 15 0 - 40 IU/L    ALT (SGPT) 12 0 - 32 IU/L   HEPATIC FUNCTION PANEL   Result Value Ref Range    Bilirubin, direct 0.08 0.00 - 0.40 mg/dL       Orders Placed This Encounter    MRI BRAIN W WO CONT     Standing Status:   Future     Standing Expiration Date:   7/18/2022     Order Specific Question:   STAT Creatinine as indicated     Answer: Yes    MRI CERV SPINE W WO CONT     Standing Status:   Future     Standing Expiration Date:   7/18/2022     Order Specific Question:   STAT Creatinine as indicated     Answer: Yes    rimegepant (Nurtec ODT) 75 mg disintegrating tablet     Sig: Take 1 tablet at HA onset PRN. Max 1 tablet in 24 hours. Dispense:  8 Tablet     Refill:  5       1. Intractable migraine without status migrainosus, unspecified migraine type    2. Chiari I malformation ([address]    3. Paresthesia        previous MRI brain showed chairi 1 and cervical spine involvement in 2012. Will want to repeat MRI brain and cervical spine to look at this specifically and stroke, lesion, as causes. FU after this. Keep Aimovig 140 mg every 30 days  Has had trouble with insurance.  Will re-print out approval and give to her  140 mg sample also. Try Nurtec ODT for PRN rescue. Failed triptans, using OTC. FU after. Will fill out FMLA.              This note will not be viewable in [facility]

## 2021-06-18 NOTE — PROGRESS NOTES
patient complains of migraines she had 2 this week and none last week. .  Chief Complaint   Patient presents with    Follow-up    Migraine     patient complains of migraines she had 2 this week and none last week.      .  Visit Vitals  /80 (BP 1 Location: Left upper arm, BP Patient Position: Sitting)   Pulse 74   Resp 16   Ht 5' 6\" (1.676 m)   Wt 173 lb (78.5 kg)   SpO2 100%   BMI 27.92 kg/m²

## 2021-07-02 ENCOUNTER — HOSPITAL ENCOUNTER (OUTPATIENT)
Dept: MRI IMAGING | Age: 56
Discharge: HOME | End: 2021-07-02
Attending: NURSE PRACTITIONER
Payer: COMMERCIAL

## 2021-07-02 VITALS — BODY MASS INDEX: 27.92 KG/M2 | WEIGHT: 173 LBS

## 2021-07-02 DIAGNOSIS — G93.5 CHIARI I MALFORMATION (HCC): ICD-10-CM

## 2021-07-02 DIAGNOSIS — R20.2 PARESTHESIA: ICD-10-CM

## 2021-07-02 DIAGNOSIS — G43.919 INTRACTABLE MIGRAINE WITHOUT STATUS MIGRAINOSUS, UNSPECIFIED MIGRAINE TYPE: ICD-10-CM

## 2021-07-02 PROCEDURE — A9575 INJ GADOTERATE MEGLUMI 0.1ML: HCPCS | Performed by: RADIOLOGY

## 2021-07-02 PROCEDURE — 72156 MRI NECK SPINE W/O & W/DYE: CPT

## 2021-07-02 PROCEDURE — 74011250636 HC RX REV CODE- 250/636: Performed by: RADIOLOGY

## 2021-07-02 PROCEDURE — 70553 MRI BRAIN STEM W/O & W/DYE: CPT

## 2021-07-02 RX ORDER — GADOTERATE MEGLUMINE 376.9 MG/ML
15 INJECTION INTRAVENOUS
Status: COMPLETED | OUTPATIENT
Start: 2021-07-02 | End: 2021-07-02

## 2021-07-02 RX ADMIN — GADOTERATE MEGLUMINE 15 ML: 376.9 INJECTION INTRAVENOUS at 09:16

## 2021-09-24 ENCOUNTER — PATIENT MESSAGE (OUTPATIENT)
Dept: NEUROLOGY | Age: 56
End: 2021-09-24

## 2021-10-19 DIAGNOSIS — G43.919 INTRACTABLE MIGRAINE WITHOUT STATUS MIGRAINOSUS, UNSPECIFIED MIGRAINE TYPE: ICD-10-CM

## 2021-10-19 NOTE — TELEPHONE ENCOUNTER
----- Message from [employer] Cart [employer] sent at 10/19/2021 11:00 AM EDT -----  Regarding: Nurse Jose/sallie  General Message/Vendor Calls    Caller's first and last name: Self       Reason for call: Medication       Callback required yes/no and why: yes to  confirm       Best contact number(s):[phone]      Details to clarify the request: patient need a refill on erenumab-aooe (Aimovig Autoinjector) 140 mg/mL injection patient Need this for next months       Thena Cart III

## 2021-10-21 RX ORDER — ERENUMAB-AOOE 140 MG/ML
140 INJECTION, SOLUTION SUBCUTANEOUS
Qty: 1 ML | Refills: 2 | Status: SHIPPED | OUTPATIENT
Start: 2021-10-21 | End: 2021-12-10 | Stop reason: SDUPTHER

## 2021-11-09 ENCOUNTER — TELEPHONE (OUTPATIENT)
Dept: NEUROLOGY | Age: 56
End: 2021-11-09

## 2021-12-06 ENCOUNTER — TELEPHONE (OUTPATIENT)
Dept: NEUROLOGY | Age: 56
End: 2021-12-06

## 2021-12-06 DIAGNOSIS — G43.919 INTRACTABLE MIGRAINE WITHOUT STATUS MIGRAINOSUS, UNSPECIFIED MIGRAINE TYPE: ICD-10-CM

## 2021-12-07 ENCOUNTER — PATIENT MESSAGE (OUTPATIENT)
Dept: NEUROLOGY | Age: 56
End: 2021-12-07

## 2021-12-10 RX ORDER — ERENUMAB-AOOE 140 MG/ML
140 INJECTION, SOLUTION SUBCUTANEOUS
Qty: 1 ML | Refills: 2 | Status: SHIPPED | OUTPATIENT
Start: 2021-12-10 | End: 2022-01-28 | Stop reason: ALTCHOICE

## 2021-12-10 RX ORDER — ELETRIPTAN HYDROBROMIDE 40 MG/1
TABLET, FILM COATED ORAL
Qty: 6 TABLET | Refills: 2 | Status: SHIPPED | OUTPATIENT
Start: 2021-12-10 | End: 2022-08-10 | Stop reason: ALTCHOICE

## 2021-12-10 NOTE — TELEPHONE ENCOUNTER
Spoke with patient and she needs 3 things. 1) needs FMLA form to show 3-4 episodes every month with 2-3 days per episode. 2) needs vv appointment. This was done while patient on the phone. She is strongly considering changing medications for acute migraine. What she is on now is not working. 3) she needs Aimovig refills sent to [facility] on [phone]. This has been set for [facility] to order.

## 2022-01-07 ENCOUNTER — PATIENT MESSAGE (OUTPATIENT)
Dept: NEUROLOGY | Age: 57
End: 2022-01-07

## 2022-01-07 ENCOUNTER — TELEPHONE (OUTPATIENT)
Dept: NEUROLOGY | Age: 57
End: 2022-01-07

## 2022-01-07 NOTE — TELEPHONE ENCOUNTER
PT wants to confirm that [place] paperwork was received by office and wants to know if VV with MIHIR ruiz can be moved up earlier since her FMLA runs out on 1/15.

## 2022-01-10 ENCOUNTER — PATIENT MESSAGE (OUTPATIENT)
Dept: NEUROLOGY | Age: 57
End: 2022-01-10

## 2022-01-28 ENCOUNTER — VIRTUAL VISIT (OUTPATIENT)
Dept: NEUROLOGY | Age: 57
End: 2022-01-28
Payer: COMMERCIAL

## 2022-01-28 DIAGNOSIS — G43.919 INTRACTABLE MIGRAINE WITHOUT STATUS MIGRAINOSUS, UNSPECIFIED MIGRAINE TYPE: Primary | ICD-10-CM

## 2022-01-28 PROCEDURE — 99214 OFFICE O/P EST MOD 30 MIN: CPT | Performed by: NURSE PRACTITIONER

## 2022-01-28 RX ORDER — GLUCOSAMINE SULFATE 1500 MG
1000 POWDER IN PACKET (EA) ORAL DAILY
COMMUNITY

## 2022-01-28 RX ORDER — ATOGEPANT 60 MG/1
1 TABLET ORAL DAILY
Qty: 90 TABLET | Refills: 1 | Status: SHIPPED | OUTPATIENT
Start: 2022-01-28 | End: 2022-01-31 | Stop reason: SDUPTHER

## 2022-01-28 NOTE — PROGRESS NOTES
Monserrat Braga is a 49 y.o. female who was seen by synchronous (real-time) audio-video technology on 1/28/2022 for Migraine    FU for migraines virtually.      She is having about 2 a week now average   On Aimovig 140   They are located unilaterally. Sharp, stabbing pain   Light and sound sensitive. Nausea also. She has noticed stress is higher recently  Weather has been triggering also. She does feel like when migraine comes on she is slower, debilitated   Sleep is normal.   Has not tried Relpax yet   Has failed Topamax, Elavil, Inderal.     Assessment & Plan:       Discussed HA. Switch Aimovig to Qulipta 60 mg daily for prevention   She has some issues with the needle and insurance. Will see benefit from this   Keep Relpax but try this and Ubrelvy for PRN rescue. Keep track of HA and call with any changes. Subjective:       Prior to Admission medications    Medication Sig Start Date End Date Taking? Authorizing Provider   cholecalciferol (Vitamin D3) 25 mcg (1,000 unit) cap Take 1,000 Units by mouth daily. Yes Provider, Historical   erenumab-aooe (Aimovig Autoinjector) 140 mg/mL injection 1 mL by SubCUTAneous route every thirty (30) days. INJECT 1 SYRINGE ONCE EVERY MONTH FOR MIGRAINE PREVENTION 12/10/21  Yes Summer Mack, NP   eletriptan (Relpax) 40 mg tablet 1 tab at onset of migraine; take 1 tab in 2 hours if headache remains; Limit: 2 tabs in 24 hours, not more than 3 days a week. 30 Day Rx 12/10/21  Yes Summer Mack NP   estradiol (ESTRACE) 0.5 mg tablet  5/13/18  Yes Provider, Historical   zolpidem (AMBIEN) 10 mg tablet  8/3/18   Provider, Historical         Review of Systems   Eyes: Positive for blurred vision and photophobia. Respiratory: Negative for shortness of breath and wheezing. Cardiovascular: Negative for chest pain and palpitations. Gastrointestinal: Positive for nausea and vomiting. Neurological: Positive for dizziness and headaches. Objective:   No flowsheet data found. [INSTRUCTIONS:  \"[x]\" Indicates a positive item  \"[]\" Indicates a negative item  -- DELETE ALL ITEMS NOT EXAMINED]    Constitutional: [x] Appears well-developed and well-nourished [x] No apparent distress      [] Abnormal -     Mental status: [x] Alert and awake  [x] Oriented to person/place/time [x] Able to follow commands    [] Abnormal -     Eyes:   EOM    [x]  Normal    [] Abnormal -   Sclera  [x]  Normal    [] Abnormal -          Discharge [x]  None visible   [] Abnormal -     HENT: [x] Normocephalic, atraumatic  [] Abnormal -   [x] Mouth/Throat: Mucous membranes are moist    External Ears [x] Normal  [] Abnormal -    Neck: [x] No visualized mass [] Abnormal -     Pulmonary/Chest: [x] Respiratory effort normal   [x] No visualized signs of difficulty breathing or respiratory distress        [] Abnormal -      Musculoskeletal:   [x] Normal gait with no signs of ataxia         [x] Normal range of motion of neck        [] Abnormal -     Neurological:        [x] No Facial Asymmetry (Cranial nerve 7 motor function) (limited exam due to video visit)          [x] No gaze palsy        [] Abnormal -          Skin:        [x] No significant exanthematous lesions or discoloration noted on facial skin         [] Abnormal -            Psychiatric:       [x] Normal Affect [] Abnormal -        [x] No Hallucinations    Other pertinent observable physical exam findings:-        We discussed the expected course, resolution and complications of the diagnosis(es) in detail. Medication risks, benefits, costs, interactions, and alternatives were discussed as indicated. I advised her to contact the office if her condition worsens, changes or fails to improve as anticipated. She expressed understanding with the diagnosis(es) and plan. Isaac Montague was evaluated through a synchronous (real-time) audio-video encounter.  The patient (or guardian if applicable) is aware that this is a billable service, which includes applicable co-pays. Verbal consent to proceed has been obtained. The visit was conducted pursuant to the emergency declaration under the [address] [place] [address] authority and the [employer] and [employer] General Act. Patient identification was verified, and a caregiver was present when appropriate. The patient was located at home in a state where the provider was licensed to provide care.       Meghan Sanz NP

## 2022-01-31 RX ORDER — ATOGEPANT 60 MG/1
1 TABLET ORAL DAILY
Qty: 90 TABLET | Refills: 1 | Status: SHIPPED | OUTPATIENT
Start: 2022-01-31 | End: 2022-02-11 | Stop reason: SDUPTHER

## 2022-02-03 ENCOUNTER — TELEPHONE (OUTPATIENT)
Dept: NEUROLOGY | Age: 57
End: 2022-02-03

## 2022-02-09 NOTE — TELEPHONE ENCOUNTER
Called [facility]- they need to know if patient is to stay on Aimovig not that we are starting Rosa Maria Doe. Checked with Jose: stop Aimovig.  Informed pharmacist @ [phone]

## 2022-02-10 ENCOUNTER — PATIENT MESSAGE (OUTPATIENT)
Dept: NEUROLOGY | Age: 57
End: 2022-02-10

## 2022-02-11 RX ORDER — ATOGEPANT 60 MG/1
1 TABLET ORAL DAILY
Qty: 90 TABLET | Refills: 1 | Status: SHIPPED | OUTPATIENT
Start: 2022-02-11 | End: 2022-03-14 | Stop reason: ALTCHOICE

## 2022-02-25 ENCOUNTER — TELEPHONE (OUTPATIENT)
Dept: NEUROLOGY | Age: 57
End: 2022-02-25

## 2022-02-25 NOTE — TELEPHONE ENCOUNTER
Re: Maurisio Au  24, created case in [facility] key# [id number]    Rcvd message Simran Lynch active PA is already on file with expiration date of 2022.  Please wait to resubmit request within 60 days of that expiration date to obtain a PA renewal.\"

## 2022-03-04 ENCOUNTER — TELEPHONE (OUTPATIENT)
Dept: NEUROLOGY | Age: 57
End: 2022-03-04

## 2022-03-04 ENCOUNTER — PATIENT MESSAGE (OUTPATIENT)
Dept: NEUROLOGY | Age: 57
End: 2022-03-04

## 2022-03-04 NOTE — TELEPHONE ENCOUNTER
Pt calling again, stating that her discount/coupon isn't working and [facility] is telling her that the medication is $600 and that they no longer have the coupon and is asking pt for the coupon, pt stated she hasn't ever had the coupon herself so she doesn't know what to do.

## 2022-03-04 NOTE — TELEPHONE ENCOUNTER
So it was approved, right? I dont know why she is?      [facility] and see if they can figure it out         Aimovig

## 2022-03-04 NOTE — TELEPHONE ENCOUNTER
Pt called saying that fair of Koffi Dowse is too expensive and her discount card is not working. Needs further guidance on what to do for medication. Please call back.

## 2022-03-07 ENCOUNTER — PATIENT MESSAGE (OUTPATIENT)
Dept: NEUROLOGY | Age: 57
End: 2022-03-07

## 2022-03-07 NOTE — TELEPHONE ENCOUNTER
Cheryl Martell has it was approved until 9/2022     So I am not sure what else to do   She can come get another card    Or we can switch or she can call her insurance?

## 2022-03-08 NOTE — TELEPHONE ENCOUNTER
Pt stated she would like to switch from Amovig to another medication that is affordable. Pt states she has called several times with no response and she is late on taking medications. Please advise.

## 2022-03-09 ENCOUNTER — PATIENT MESSAGE (OUTPATIENT)
Dept: NEUROLOGY | Age: 57
End: 2022-03-09

## 2022-03-10 ENCOUNTER — TELEPHONE (OUTPATIENT)
Dept: NEUROLOGY | Age: 57
End: 2022-03-10

## 2022-03-10 NOTE — TELEPHONE ENCOUNTER
Patient called to speak to [facility] or his nurse about Stacy Green. Patient stated that she does not have access to [employer] so she has not been getting the messages.  Patient would like a call back to discuss everything whenever possible at [phone]

## 2022-03-10 NOTE — TELEPHONE ENCOUNTER
Left message for patient call returned- reviewed the messages of the past week and told her the myChart has been deactivated.

## 2022-03-11 NOTE — TELEPHONE ENCOUNTER
Spoke with patient and informed her the RX was sent to [facility]. She states [facility] told her they had nothing for her. Called [facility] (hold for 25 minutes) and was told they got the RX but it was filled elswhere on 3/7/22. Insurance will not allow [facility] to fill as a second refill. Asked if they would be able to provide refills, and was told it looks like a mail order snagged the RX so they probably got the refills. He does not know which one.   Patient informed

## 2022-03-14 ENCOUNTER — TELEPHONE (OUTPATIENT)
Dept: NEUROLOGY | Age: 57
End: 2022-03-14

## 2022-03-17 NOTE — TELEPHONE ENCOUNTER
Needs [facility].

## 2022-03-19 PROBLEM — G43.011 INTRACTABLE MIGRAINE WITHOUT AURA AND WITH STATUS MIGRAINOSUS: Status: ACTIVE | Noted: 2018-08-17

## 2022-03-21 NOTE — PROGRESS NOTES
Approvedtoday  CaseId:[id number];Status:Approved; Review Type:Prior Auth; Coverage Start Date:03/14/2022; Coverage End Date:03/21/2023;          Emgality 120 mg     1 syringe/30 days

## 2022-04-14 ENCOUNTER — TELEPHONE (OUTPATIENT)
Dept: NEUROLOGY | Age: 57
End: 2022-04-14

## 2022-05-09 NOTE — TELEPHONE ENCOUNTER
Re; Emgality    Created [facility] key# [id number], rcvd message Leon Sprague active PA is already on file with expiration date of 03/21/2023. \"    Also for Lizette Modi PA is on file, not sure which med pt's wants to stick with. Pt has high copay, should be able to use the copay discount cards. Sent update to nurse.

## 2022-05-10 ENCOUNTER — VIRTUAL VISIT (OUTPATIENT)
Dept: NEUROLOGY | Age: 57
End: 2022-05-10
Payer: COMMERCIAL

## 2022-05-10 ENCOUNTER — TELEPHONE (OUTPATIENT)
Dept: NEUROLOGY | Age: 57
End: 2022-05-10

## 2022-05-10 DIAGNOSIS — G43.011 INTRACTABLE MIGRAINE WITHOUT AURA AND WITH STATUS MIGRAINOSUS: Primary | ICD-10-CM

## 2022-05-10 PROCEDURE — 99214 OFFICE O/P EST MOD 30 MIN: CPT | Performed by: NURSE PRACTITIONER

## 2022-05-10 RX ORDER — FREMANEZUMAB-VFRM 225 MG/1.5ML
225 INJECTION SUBCUTANEOUS
Qty: 1.5 ML | Refills: 5 | Status: SHIPPED | OUTPATIENT
Start: 2022-05-10 | End: 2022-08-10 | Stop reason: ALTCHOICE

## 2022-05-10 RX ORDER — RIMEGEPANT SULFATE 75 MG/75MG
TABLET, ORALLY DISINTEGRATING ORAL
Qty: 8 TABLET | Refills: 5 | Status: SHIPPED | OUTPATIENT
Start: 2022-05-10 | End: 2022-08-10 | Stop reason: ALTCHOICE

## 2022-05-11 NOTE — PROGRESS NOTES
Anya Zapata is a 56 y.o. female who was seen by synchronous (real-time) audio-video technology on 5/10/2022 for Migraine       FU for migraines virtually. Increasing due to being off injections   Sofia Parra was working really well but insurance still really expensive. Not able to get Emgality due to PA process.      She is having about 4 a week now average   They are located unilaterally. Sharp, stabbing pain   Light and sound sensitive. Nausea also. She has noticed stress is higher recently  Weather has been triggering also.      She does feel like when migraine comes on she is slower, debilitated   Sleep is normal.   Has not tried Relpax yet   Has failed Topamax, Elavil, Inderal.  Sofia Parra worked really well   Never used Nurtec. Ubrelvy did not help. Assessment & Plan:   Diagnoses and all orders for this visit:    1. Intractable migraine without aura and with status migrainosus    Other orders  -     rimegepant (Nurtec ODT) 75 mg disintegrating tablet; Take 1 tablet by mouth at HA onset PRN. Max 1 tablet in 24hours. -     fremanezumab-vfrm (Ajovy Autoinjector) 225 mg/1.5 mL auto-injector; 1.5 mL by SubCUTAneous route every month. Migraines uncontrolled  Try Ajovy as preferred for insurance. 225 every 30 days   Nurtec for PRN rescue. Figure out how to get approved. Subjective:       Prior to Admission medications    Medication Sig Start Date End Date Taking? Authorizing Provider   rimegepant (Nurtec ODT) 75 mg disintegrating tablet Take 1 tablet by mouth at HA onset PRN. Max 1 tablet in 24hours. 5/10/22  Yes Carlo Mack NP   fremanezumab-vfrm (Ajovy Autoinjector) 225 mg/1.5 mL auto-injector 1.5 mL by SubCUTAneous route every month. 5/10/22  Yes Carlo Mack NP   cholecalciferol (Vitamin D3) 25 mcg (1,000 unit) cap Take 1,000 Units by mouth daily.    Yes Provider, Historical   estradiol (ESTRACE) 0.5 mg tablet  5/13/18  Yes Provider, Historical   zolpidem (AMBIEN) 10 mg tablet  8/3/18  Yes Provider, Historical   ubrogepant Kraig Leak) 100 mg tablet 1 at HA onset and repeat in 2 hours if needed. Max 2 in 24 hours  BIN: [id number] PCN: [id number] GRP: [id number] ID [id number]  Patient not taking: Reported on 5/10/2022 1/28/22   O'Laughlin, Estill Landau, NP   eletriptan (Relpax) 40 mg tablet 1 tab at onset of migraine; take 1 tab in 2 hours if headache remains; Limit: 2 tabs in 24 hours, not more than 3 days a week. 30 Day Rx  Patient not taking: Reported on 5/10/2022 12/10/21   Celestino Marvin NP     Patient Active Problem List   Diagnosis Code    Migraine G43.909    Surgical menopause E89.40    Intractable migraine without aura and with status migrainosus G43.011     Patient Active Problem List    Diagnosis Date Noted    Intractable migraine without aura and with status migrainosus 08/17/2018    Surgical menopause 12/19/2012    Migraine 09/20/2012     Current Outpatient Medications   Medication Sig Dispense Refill    rimegepant (Nurtec ODT) 75 mg disintegrating tablet Take 1 tablet by mouth at HA onset PRN. Max 1 tablet in 24hours. 8 Tablet 5    fremanezumab-vfrm (Ajovy Autoinjector) 225 mg/1.5 mL auto-injector 1.5 mL by SubCUTAneous route every month. 1.5 mL 5    cholecalciferol (Vitamin D3) 25 mcg (1,000 unit) cap Take 1,000 Units by mouth daily.  estradiol (ESTRACE) 0.5 mg tablet       zolpidem (AMBIEN) 10 mg tablet       ubrogepant (Ubrelvy) 100 mg tablet 1 at HA onset and repeat in 2 hours if needed. Max 2 in 24 hours  BIN: [id number] PCN: [id number] GRP: [id number] ID [id number] (Patient not taking: Reported on 5/10/2022) 48 Tablet 1    eletriptan (Relpax) 40 mg tablet 1 tab at onset of migraine; take 1 tab in 2 hours if headache remains; Limit: 2 tabs in 24 hours, not more than 3 days a week.  30 Day Rx (Patient not taking: Reported on 5/10/2022) 6 Tablet 2     Allergies   Allergen Reactions    Sulfa (Sulfonamide Antibiotics) Unknown (comments)     Broke out in a rash in her mouth     Past Medical History:   Diagnosis Date    Migraine      Past Surgical History:   Procedure Laterality Date    HX GYN  2010    hysterectomy     Family History   Problem Relation Age of Onset    Diabetes Mother     Cancer Father      Social History     Tobacco Use    Smoking status: Never Smoker    Smokeless tobacco: Never Used   Substance Use Topics    Alcohol use: Yes     Comment: rarely       Review of Systems   Eyes: Positive for blurred vision and photophobia. Negative for double vision. Respiratory: Negative for shortness of breath and wheezing. Gastrointestinal: Positive for nausea. Negative for vomiting. Neurological: Positive for dizziness and headaches. Negative for seizures and loss of consciousness. Objective:   No flowsheet data found.      [INSTRUCTIONS:  \"[x]\" Indicates a positive item  \"[]\" Indicates a negative item  -- DELETE ALL ITEMS NOT EXAMINED]    Constitutional: [x] Appears well-developed and well-nourished [x] No apparent distress      [] Abnormal -     Mental status: [x] Alert and awake  [x] Oriented to person/place/time [x] Able to follow commands    [] Abnormal -     Eyes:   EOM    [x]  Normal    [] Abnormal -   Sclera  [x]  Normal    [] Abnormal -          Discharge [x]  None visible   [] Abnormal -     HENT: [x] Normocephalic, atraumatic  [] Abnormal -   [x] Mouth/Throat: Mucous membranes are moist    External Ears [x] Normal  [] Abnormal -    Neck: [x] No visualized mass [] Abnormal -     Pulmonary/Chest: [x] Respiratory effort normal   [x] No visualized signs of difficulty breathing or respiratory distress        [] Abnormal -      Musculoskeletal:   [x] Normal gait with no signs of ataxia         [x] Normal range of motion of neck        [] Abnormal -     Neurological:        [x] No Facial Asymmetry (Cranial nerve 7 motor function) (limited exam due to video visit)          [x] No gaze palsy        [] Abnormal -          Skin:        [x] No significant exanthematous lesions or discoloration noted on facial skin         [] Abnormal -            Psychiatric:       [x] Normal Affect [] Abnormal -        [x] No Hallucinations    Other pertinent observable physical exam findings:-        We discussed the expected course, resolution and complications of the diagnosis(es) in detail. Medication risks, benefits, costs, interactions, and alternatives were discussed as indicated. I advised her to contact the office if her condition worsens, changes or fails to improve as anticipated. She expressed understanding with the diagnosis(es) and plan. Tony Piper, was evaluated through a synchronous (real-time) audio-video encounter. The patient (or guardian if applicable) is aware that this is a billable service, which includes applicable co-pays. Verbal consent to proceed has been obtained. The visit was conducted pursuant to the emergency declaration under the [address] [place] [address], [address] [place] [address] authority and the [employer] General Act. Patient identification was verified, and a caregiver was present when appropriate. The patient was located at home in a state where the provider was licensed to provide care.       Pola Edwards NP

## 2022-05-31 ENCOUNTER — TELEPHONE (OUTPATIENT)
Dept: NEUROLOGY | Age: 57
End: 2022-05-31

## 2022-06-28 NOTE — TELEPHONE ENCOUNTER
Re: Nurtec    rcvd PA approval effective 05/31/22-06/01/23 auth# [id number]    Sent update to [employer] via weekly spreadsheet.

## 2022-08-10 ENCOUNTER — TELEPHONE (OUTPATIENT)
Dept: NEUROLOGY | Age: 57
End: 2022-08-10

## 2022-08-10 ENCOUNTER — VIRTUAL VISIT (OUTPATIENT)
Dept: NEUROLOGY | Age: 57
End: 2022-08-10
Payer: COMMERCIAL

## 2022-08-10 DIAGNOSIS — R42 DIZZINESS: ICD-10-CM

## 2022-08-10 DIAGNOSIS — G93.5 CHIARI I MALFORMATION (HCC): ICD-10-CM

## 2022-08-10 DIAGNOSIS — H53.9 VISUAL CHANGES: ICD-10-CM

## 2022-08-10 DIAGNOSIS — G43.011 INTRACTABLE MIGRAINE WITHOUT AURA AND WITH STATUS MIGRAINOSUS: Primary | ICD-10-CM

## 2022-08-10 PROCEDURE — 99214 OFFICE O/P EST MOD 30 MIN: CPT | Performed by: NURSE PRACTITIONER

## 2022-08-10 RX ORDER — RIMEGEPANT SULFATE 75 MG/75MG
TABLET, ORALLY DISINTEGRATING ORAL
Qty: 8 TABLET | Refills: 5 | Status: SHIPPED | OUTPATIENT
Start: 2022-08-10

## 2022-08-10 RX ORDER — ATOGEPANT 60 MG/1
1 TABLET ORAL DAILY
Qty: 90 TABLET | Refills: 1 | Status: SHIPPED | OUTPATIENT
Start: 2022-08-10

## 2022-08-10 NOTE — PROGRESS NOTES
Sergio Handy is a 56 y.o. female who was seen by synchronous (real-time) audio-video technology on 8/10/2022 for Migraine (Migraines had gotten worse but have started to ease up a little bit. Both injections were going to cost her $500, which she cannot afford.)        FU for migraines virtually. Antonio Uribe was working really well but insurance still really expensive. She is having about 10 a month   They are located unilaterally. Sharp, stabbing pain  Light and sound sensitive. Nausea also. She has noticed stress is higher recently  Weather has been triggering also. She does feel like when migraine comes on she is slower, debilitated  Sleep is normal.   Relpax does not help  Has failed Topamax, Elavil, Inderal.  Aimovig worked really well- insurance charging a ton. Never used Nurtec. Ubrelvy did not help. She is noticing the past few weeks a significant increase in dizziness, feeling off  She feels like at times she may pass out. Getting up she has to move slow  She is not sure why   Was told by her PCP it was her vitamin d but not sure about that  Hx of Chiari 1         Assessment & Plan:   Diagnoses and all orders for this visit:    1. Intractable migraine without aura and with status migrainosus  -     atogepant (Qulipta) 60 mg tab; Take 1 Tablet by mouth daily.  -     MRI BRAIN W WO CONT; Future    2. Chiari I malformation ([address]  -     MRI BRAIN W WO CONT; Future    3. Dizziness  -     MRI BRAIN W WO CONT; Future  -     REFERRAL TO NEUROLOGY    4. Visual changes  -     MRI BRAIN W WO CONT; Future    Other orders  -     rimegepant (Nurtec ODT) 75 mg disintegrating tablet; Take 1 tablet by mouth at HA onset PRN. Max 1 tablet in 24hours. MRI brain to evaluate chairi better  She had some crowding last MRI   Will also look at cause of dizziness, near syncope in stroke, lesion, tumor.    Try Gladis Bashir for prevention of migraine   60 mg daily   Nurtec ODT for PRN is working- keep this   [employer]War after    Also will get ANS to look for autonomic dyfunction         Subjective:       Prior to Admission medications    Medication Sig Start Date End Date Taking? Authorizing Provider   atogepanlang Casas) 60 mg tab Take 1 Tablet by mouth daily. 8/10/22  Yes Jennie Mack NP   rimegepant (Nurtec ODT) 75 mg disintegrating tablet Take 1 tablet by mouth at HA onset PRN. Max 1 tablet in 24hours. 8/10/22  Yes Jennie Mack NP   cholecalciferol (VITAMIN D3) 25 mcg (1,000 unit) cap Take 1,000 Units by mouth daily. Yes Provider, Historical   estradiol (ESTRACE) 0.5 mg tablet  5/13/18  Yes Provider, Historical   rimegepant (Nurtec ODT) 75 mg disintegrating tablet Take 1 tablet by mouth at HA onset PRN. Max 1 tablet in 24hours. Patient not taking: Reported on 8/9/2022 5/10/22 8/10/22  Jennie Mack NP   fremanezumab-vfrm Troy Jules Autoinjector) 225 mg/1.5 mL auto-injector 1.5 mL by SubCUTAneous route every month. Patient not taking: Reported on 8/9/2022 5/10/22 8/10/22  Jennie Mack NP   ubrogepant Arthur Flatness) 100 mg tablet 1 at HA onset and repeat in 2 hours if needed. Max 2 in 24 hours  BIN: [id number] PCN: [id number] GRP: [id number] ID [id number]  Patient not taking: Reported on 5/10/2022 1/28/22 8/10/22  Jennie Mack NP   eletriptan (Relpax) 40 mg tablet 1 tab at onset of migraine; take 1 tab in 2 hours if headache remains; Limit: 2 tabs in 24 hours, not more than 3 days a week.  30 Day Rx  Patient not taking: No sig reported 12/10/21 8/10/22  Luz Maria Lowery NP   zolpidem (AMBIEN) 10 mg tablet  8/3/18 8/10/22  Provider, Historical     Patient Active Problem List   Diagnosis Code    Migraine G43.909    Surgical menopause E89.40    Intractable migraine without aura and with status migrainosus G43.011     Patient Active Problem List    Diagnosis Date Noted    Intractable migraine without aura and with status migrainosus 08/17/2018    Surgical menopause 12/19/2012    Migraine 09/20/2012     Current Outpatient Medications   Medication Sig Dispense Refill    atogepant (Qulipta) 60 mg tab Take 1 Tablet by mouth daily. 90 Tablet 1    rimegepant (Nurtec ODT) 75 mg disintegrating tablet Take 1 tablet by mouth at HA onset PRN. Max 1 tablet in 24hours. 8 Tablet 5    cholecalciferol (VITAMIN D3) 25 mcg (1,000 unit) cap Take 1,000 Units by mouth daily. estradiol (ESTRACE) 0.5 mg tablet        Allergies   Allergen Reactions    Sulfa (Sulfonamide Antibiotics) Unknown (comments)     Broke out in a rash in her mouth     Past Medical History:   Diagnosis Date    Migraine      Past Surgical History:   Procedure Laterality Date    HX GYN  2010    hysterectomy     Family History   Problem Relation Age of Onset    Diabetes Mother     Cancer Father      Social History     Tobacco Use    Smoking status: Never    Smokeless tobacco: Never   Substance Use Topics    Alcohol use: Yes     Comment: rarely       Review of Systems   Eyes:  Positive for blurred vision and photophobia. Negative for double vision. Respiratory:  Negative for shortness of breath and wheezing. Cardiovascular:  Negative for chest pain and palpitations. Gastrointestinal:  Positive for nausea. Negative for vomiting. Musculoskeletal:  Positive for neck pain. Negative for falls. Neurological:  Positive for dizziness and headaches. Negative for tingling, tremors, seizures, loss of consciousness and weakness. Psychiatric/Behavioral:  Negative for memory loss. Objective:   No flowsheet data found.      [INSTRUCTIONS:  \"[x]\" Indicates a positive item  \"[]\" Indicates a negative item  -- DELETE ALL ITEMS NOT EXAMINED]    Constitutional: [x] Appears well-developed and well-nourished [x] No apparent distress      [] Abnormal -     Mental status: [x] Alert and awake  [x] Oriented to person/place/time [x] Able to follow commands    [] Abnormal -     Eyes:   EOM    [x]  Normal    [] Abnormal -   Sclera  [x]  Normal    [] Abnormal -          Discharge [x] None visible   [] Abnormal -     HENT: [x] Normocephalic, atraumatic  [] Abnormal -   [x] Mouth/Throat: Mucous membranes are moist    External Ears [x] Normal  [] Abnormal -    Neck: [x] No visualized mass [] Abnormal -     Pulmonary/Chest: [x] Respiratory effort normal   [x] No visualized signs of difficulty breathing or respiratory distress        [] Abnormal -      Musculoskeletal:   [x] Normal gait with no signs of ataxia         [x] Normal range of motion of neck        [] Abnormal -     Neurological:        [x] No Facial Asymmetry (Cranial nerve 7 motor function) (limited exam due to video visit)          [x] No gaze palsy        [] Abnormal -          Skin:        [x] No significant exanthematous lesions or discoloration noted on facial skin         [] Abnormal -            Psychiatric:       [x] Normal Affect [] Abnormal -        [x] No Hallucinations    Other pertinent observable physical exam findings:-        We discussed the expected course, resolution and complications of the diagnosis(es) in detail. Medication risks, benefits, costs, interactions, and alternatives were discussed as indicated. I advised her to contact the office if her condition worsens, changes or fails to improve as anticipated. She expressed understanding with the diagnosis(es) and plan. Erna Flores, was evaluated through a synchronous (real-time) audio-video encounter. The patient (or guardian if applicable) is aware that this is a billable service, which includes applicable co-pays. This Virtual Visit was conducted with patient's (and/or legal guardian's) consent. The visit was conducted pursuant to the emergency declaration under the [address] [place] [address] and the [employer] Act. Patient identification was verified, and a caregiver was present when appropriate.   The patient was located at: Home: [facility] [id number]  The provider was located at:  Facility (Appt Department): [address] [place] [address] [address], NP

## 2022-09-09 ENCOUNTER — TELEPHONE (OUTPATIENT)
Dept: NEUROLOGY | Age: 57
End: 2022-09-09

## 2022-09-13 NOTE — TELEPHONE ENCOUNTER
Spoke with patient, and she is feeling better after stopping the [place] 3 days ago. Sates she did not do the testing as she has too many medical bills right now. She would really like to get back on the Aimovig, which really worked well for her. But she cannot afford the $500 copayment. Is there any help out there for that? She is just on her 's [employer] now.

## 2022-10-10 ENCOUNTER — TELEPHONE (OUTPATIENT)
Dept: NEUROLOGY | Age: 57
End: 2022-10-10

## 2022-10-10 NOTE — TELEPHONE ENCOUNTER
Pt called requesting for an update. She states she had spoken with a nurse about getting back on Aimovig and she took one Sept 19, she would be due again Oct 19th. She states nurse was going to look into getting it more affordable for her. Please contact.

## 2022-10-12 DIAGNOSIS — G43.011 INTRACTABLE MIGRAINE WITHOUT AURA AND WITH STATUS MIGRAINOSUS: ICD-10-CM

## 2022-10-12 DIAGNOSIS — G43.919 INTRACTABLE MIGRAINE WITHOUT STATUS MIGRAINOSUS, UNSPECIFIED MIGRAINE TYPE: Primary | ICD-10-CM

## 2022-10-12 RX ORDER — ERENUMAB-AOOE 140 MG/ML
140 INJECTION, SOLUTION SUBCUTANEOUS
Qty: 1 EACH | Refills: 3 | Status: SHIPPED | OUTPATIENT
Start: 2022-10-12

## 2022-11-07 ENCOUNTER — TELEPHONE (OUTPATIENT)
Dept: NEUROLOGY | Age: 57
End: 2022-11-07

## 2022-11-23 DIAGNOSIS — G43.011 INTRACTABLE MIGRAINE WITHOUT AURA AND WITH STATUS MIGRAINOSUS: ICD-10-CM

## 2022-11-23 DIAGNOSIS — G43.919 INTRACTABLE MIGRAINE WITHOUT STATUS MIGRAINOSUS, UNSPECIFIED MIGRAINE TYPE: ICD-10-CM

## 2022-11-23 NOTE — TELEPHONE ENCOUNTER
Pt calling to make aware pharmacy needs new rx for aimovig.    erenumab-aooe (Aimovig Autoinjector) 140 mg/mL injection

## 2022-11-28 RX ORDER — ERENUMAB-AOOE 140 MG/ML
140 INJECTION, SOLUTION SUBCUTANEOUS
Qty: 1 EACH | Refills: 3 | OUTPATIENT
Start: 2022-11-28

## 2022-11-29 RX ORDER — ERENUMAB-AOOE 140 MG/ML
140 INJECTION, SOLUTION SUBCUTANEOUS
Qty: 1 EACH | Refills: 3 | Status: SHIPPED | OUTPATIENT
Start: 2022-11-29

## 2023-01-12 ENCOUNTER — HOSPITAL ENCOUNTER (EMERGENCY)
Age: 58
Discharge: HOME | End: 2023-01-12
Attending: EMERGENCY MEDICINE
Payer: COMMERCIAL

## 2023-01-12 ENCOUNTER — APPOINTMENT (OUTPATIENT)
Dept: CT IMAGING | Age: 58
End: 2023-01-12
Attending: EMERGENCY MEDICINE
Payer: COMMERCIAL

## 2023-01-12 VITALS
OXYGEN SATURATION: 99 % | RESPIRATION RATE: 18 BRPM | WEIGHT: 160 LBS | SYSTOLIC BLOOD PRESSURE: 141 MMHG | HEART RATE: 65 BPM | HEIGHT: 66 IN | BODY MASS INDEX: 25.71 KG/M2 | DIASTOLIC BLOOD PRESSURE: 83 MMHG | TEMPERATURE: 98.4 F

## 2023-01-12 DIAGNOSIS — R10.9 LEFT FLANK PAIN: Primary | ICD-10-CM

## 2023-01-12 LAB
ANION GAP SERPL CALC-SCNC: 9 MMOL/L (ref 5–15)
APPEARANCE UR: CLEAR
BACTERIA URNS QL MICRO: NEGATIVE /HPF
BASOPHILS # BLD: 0 K/UL (ref 0–1)
BASOPHILS NFR BLD: 1 % (ref 0–1)
BILIRUB UR QL: NEGATIVE
BUN SERPL-MCNC: 14 MG/DL (ref 6–20)
BUN/CREAT SERPL: 16 (ref 12–20)
CALCIUM SERPL-MCNC: 9.4 MG/DL (ref 8.6–10)
CHLORIDE SERPL-SCNC: 102 MMOL/L (ref 98–107)
CO2 SERPL-SCNC: 32 MMOL/L (ref 22–29)
COLOR UR: ABNORMAL
CREAT SERPL-MCNC: 0.85 MG/DL (ref 0.5–0.9)
DIFFERENTIAL METHOD BLD: ABNORMAL
EOSINOPHIL # BLD: 0.2 K/UL (ref 0–0.4)
EOSINOPHIL NFR BLD: 3 %
EPITH CASTS URNS QL MICRO: ABNORMAL /LPF
ERYTHROCYTE [DISTWIDTH] IN BLOOD BY AUTOMATED COUNT: 11.4 % (ref 11.5–14.5)
GLUCOSE SERPL-MCNC: 125 MG/DL (ref 65–100)
GLUCOSE UR STRIP.AUTO-MCNC: NEGATIVE MG/DL
HCT VFR BLD AUTO: 34.7 % (ref 35–47)
HGB BLD-MCNC: 11.7 G/DL (ref 11.5–16)
HGB UR QL STRIP: ABNORMAL
IMM GRANULOCYTES # BLD AUTO: 0 K/UL (ref 0–0.04)
IMM GRANULOCYTES NFR BLD AUTO: 0 % (ref 0–0.5)
KETONES UR QL STRIP.AUTO: NEGATIVE MG/DL
LEUKOCYTE ESTERASE UR QL STRIP.AUTO: NEGATIVE
LYMPHOCYTES # BLD: 2 K/UL (ref 0.8–3.5)
LYMPHOCYTES NFR BLD: 34 % (ref 12–49)
MCH RBC QN AUTO: 32.1 PG (ref 26–34)
MCHC RBC AUTO-ENTMCNC: 33.7 G/DL (ref 30–36.5)
MCV RBC AUTO: 95.1 FL (ref 80–99)
MONOCYTES # BLD: 0.4 K/UL (ref 0–1)
MONOCYTES NFR BLD: 7 % (ref 5–13)
NEUTS SEG # BLD: 3.3 K/UL (ref 1.8–8)
NEUTS SEG NFR BLD: 55 % (ref 32–75)
NITRITE UR QL STRIP.AUTO: NEGATIVE
NRBC # BLD: 0 K/UL (ref 0–0.01)
NRBC BLD-RTO: 0 PER 100 WBC
PH UR STRIP: 6.5 (ref 5–8)
PLATELET # BLD AUTO: 307 K/UL (ref 150–400)
PMV BLD AUTO: 10.8 FL (ref 8.9–12.9)
POTASSIUM SERPL-SCNC: 3.3 MMOL/L (ref 3.5–5.1)
PROT UR STRIP-MCNC: NEGATIVE MG/DL
RBC # BLD AUTO: 3.65 M/UL (ref 3.8–5.2)
RBC #/AREA URNS HPF: ABNORMAL /HPF
SODIUM SERPL-SCNC: 143 MMOL/L (ref 136–145)
SP GR UR REFRACTOMETRY: 1.02 (ref 1–1.03)
UR CULT HOLD, URHOLD: NORMAL
UROBILINOGEN UR QL STRIP.AUTO: 0.2 EU/DL (ref 0.2–1)
WBC # BLD AUTO: 5.9 K/UL (ref 3.6–11)
WBC URNS QL MICRO: ABNORMAL /HPF (ref 0–4)

## 2023-01-12 PROCEDURE — 81001 URINALYSIS AUTO W/SCOPE: CPT

## 2023-01-12 PROCEDURE — 74011250636 HC RX REV CODE- 250/636: Performed by: EMERGENCY MEDICINE

## 2023-01-12 PROCEDURE — 96374 THER/PROPH/DIAG INJ IV PUSH: CPT

## 2023-01-12 PROCEDURE — 99284 EMERGENCY DEPT VISIT MOD MDM: CPT

## 2023-01-12 PROCEDURE — 36415 COLL VENOUS BLD VENIPUNCTURE: CPT

## 2023-01-12 PROCEDURE — 85025 COMPLETE CBC W/AUTO DIFF WBC: CPT

## 2023-01-12 PROCEDURE — 74176 CT ABD & PELVIS W/O CONTRAST: CPT

## 2023-01-12 PROCEDURE — 80048 BASIC METABOLIC PNL TOTAL CA: CPT

## 2023-01-12 RX ORDER — CYCLOBENZAPRINE HCL 10 MG
10 TABLET ORAL
Qty: 12 TABLET | Refills: 0 | Status: SHIPPED | OUTPATIENT
Start: 2023-01-12

## 2023-01-12 RX ORDER — ZOLPIDEM TARTRATE 10 MG/1
10 TABLET ORAL
COMMUNITY

## 2023-01-12 RX ORDER — SODIUM CHLORIDE 0.9 % (FLUSH) 0.9 %
5-40 SYRINGE (ML) INJECTION EVERY 8 HOURS
Status: DISCONTINUED | OUTPATIENT
Start: 2023-01-12 | End: 2023-01-12 | Stop reason: HOSPADM

## 2023-01-12 RX ORDER — KETOROLAC TROMETHAMINE 30 MG/ML
30 INJECTION, SOLUTION INTRAMUSCULAR; INTRAVENOUS
Status: COMPLETED | OUTPATIENT
Start: 2023-01-12 | End: 2023-01-12

## 2023-01-12 RX ORDER — LIDOCAINE 50 MG/G
PATCH TOPICAL
Qty: 15 EACH | Refills: 0 | Status: SHIPPED | OUTPATIENT
Start: 2023-01-12

## 2023-01-12 RX ORDER — SODIUM CHLORIDE 0.9 % (FLUSH) 0.9 %
5-40 SYRINGE (ML) INJECTION AS NEEDED
Status: DISCONTINUED | OUTPATIENT
Start: 2023-01-12 | End: 2023-01-12 | Stop reason: HOSPADM

## 2023-01-12 RX ADMIN — SODIUM CHLORIDE 1000 ML: 9 INJECTION, SOLUTION INTRAVENOUS at 06:19

## 2023-01-12 RX ADMIN — KETOROLAC TROMETHAMINE 30 MG: 30 INJECTION, SOLUTION INTRAMUSCULAR at 06:27

## 2023-01-12 NOTE — Clinical Note
[address] [place] [address] Dr [facility]  [address] [facility]  [place] [id number] [phone]    Work/School Note    Date: 1/12/2023    To Whom It May concern:    Fidel Sterling was seen and treated today in the emergency room by the following provider(s):  Attending Provider: Haresh Reilly MD.      Fidel Sterling is excused from work/school on 01/12/23 and 01/13/23. She is medically clear to return to work/school on 1/14/2023.        Sincerely,          Kelin Llanos MD

## 2023-01-12 NOTE — ED PROVIDER NOTES
History of migraines. She presents with complaints of a 1 day history of left flank pain. It is been constant and sharp. No exacerbating or relieving factors. It does not radiate. She cannot think of anything that she might have done to aggravate her back. No urinary symptoms. No nausea or vomiting. She states that she did pass a hard stool yesterday and questions whether it could be from Mohan. \"       Past Medical History:   Diagnosis Date    Migraine        Past Surgical History:   Procedure Laterality Date    HX GYN  2010    hysterectomy         Family History:   Problem Relation Age of Onset    Diabetes Mother     Cancer Father        Social History     Socioeconomic History    Marital status:      Spouse name: Not on file    Number of children: Not on file    Years of education: Not on file    Highest education level: Not on file   Occupational History    Not on file   Tobacco Use    Smoking status: Never    Smokeless tobacco: Never   Substance and Sexual Activity    Alcohol use: Yes     Comment: rarely    Drug use: No    Sexual activity: Yes     Partners: Male     Birth control/protection: None   Other Topics Concern    Not on file   Social History Narrative    Not on file     Social Determinants of Health     Financial Resource Strain: Not on file   Food Insecurity: Not on file   Transportation Needs: Not on file   Physical Activity: Not on file   Stress: Not on file   Social Connections: Not on file   Intimate Partner Violence: Not on file   Housing Stability: Not on file         ALLERGIES: Sulfa (sulfonamide antibiotics)    Review of Systems   All other systems reviewed and are negative. Vitals:    01/12/23 0558   BP: 117/88   Pulse: 86   Resp: 20   Temp: 98.4 °F (36.9 °C)   SpO2: 99%   Weight: 72.6 kg (160 lb)   Height: 5' 6\" (1.676 m)            Physical Exam  Vitals and nursing note reviewed. Constitutional:       Appearance: She is well-developed.       Comments: Appears uncomfortable HENT:      Head: Normocephalic and atraumatic. Eyes:      Conjunctiva/sclera: Conjunctivae normal.   Neck:      Trachea: No tracheal deviation. Cardiovascular:      Rate and Rhythm: Normal rate and regular rhythm. Heart sounds: Normal heart sounds. No murmur heard. No friction rub. No gallop. Pulmonary:      Effort: Pulmonary effort is normal.      Breath sounds: Normal breath sounds. Abdominal:      Palpations: Abdomen is soft. Tenderness: There is no abdominal tenderness. Musculoskeletal:         General: No deformity. Cervical back: Neck supple. Skin:     General: Skin is warm and dry. Neurological:      Mental Status: She is alert. Comments: oriented        Medical Decision Making  Amount and/or Complexity of Data Reviewed  Labs: ordered. Radiology: ordered. Risk  Prescription drug management. Procedures    Progress Note:  Results, treatment, and follow up plan have been discussed with patient. Questions were answered. Tashi Lund MD  6:52 AM      Assessment/plan: Left flank pain. Differential diagnosis includes ureteral colic, musculoskeletal pain, AAA, bowel obstruction, shingles, and others. Favor musculoskeletal pain. Reassuring appearance/exam with stable vital signs. CBC, BMP, UA, CT okay. Prescriptions for Flexeril and Lidoderm patches. Continue ibuprofen. PCP follow-up. Return precautions.   Tashi Lund MD  6:53 AM

## 2023-03-21 DIAGNOSIS — G43.011 INTRACTABLE MIGRAINE WITHOUT AURA AND WITH STATUS MIGRAINOSUS: ICD-10-CM

## 2023-03-21 DIAGNOSIS — G43.919 INTRACTABLE MIGRAINE WITHOUT STATUS MIGRAINOSUS, UNSPECIFIED MIGRAINE TYPE: ICD-10-CM

## 2023-03-21 NOTE — TELEPHONE ENCOUNTER
Patient called requesting a call back from the nurse. The patient stated that she needs a refill on her Aimovig and that she is due to take it on 3/25/2023. Patient also requested to switch the pharmacy from [facility] to the [facility] at [address]. Requested Prescriptions     Pending Prescriptions Disp Refills    erenumab-aooe (Aimovig Autoinjector) 140 mg/mL injection 1 Each 3     Si mL by SubCUTAneous route every thirty (30) days.

## 2023-03-24 RX ORDER — ERENUMAB-AOOE 140 MG/ML
140 INJECTION, SOLUTION SUBCUTANEOUS
Qty: 1 EACH | Refills: 3 | Status: SHIPPED | OUTPATIENT
Start: 2023-03-24

## 2023-06-17 ENCOUNTER — HOSPITAL ENCOUNTER (EMERGENCY)
Facility: HOSPITAL | Age: 58
Discharge: HOME | End: 2023-06-17
Attending: EMERGENCY MEDICINE
Payer: COMMERCIAL

## 2023-06-17 ENCOUNTER — APPOINTMENT (OUTPATIENT)
Facility: HOSPITAL | Age: 58
End: 2023-06-17
Payer: COMMERCIAL

## 2023-06-17 VITALS
SYSTOLIC BLOOD PRESSURE: 129 MMHG | TEMPERATURE: 98.5 F | HEART RATE: 78 BPM | WEIGHT: 184 LBS | OXYGEN SATURATION: 100 % | HEIGHT: 66 IN | RESPIRATION RATE: 16 BRPM | DIASTOLIC BLOOD PRESSURE: 90 MMHG | BODY MASS INDEX: 29.57 KG/M2

## 2023-06-17 DIAGNOSIS — S09.90XA MINOR HEAD INJURY, INITIAL ENCOUNTER: ICD-10-CM

## 2023-06-17 DIAGNOSIS — R55 SYNCOPE AND COLLAPSE: Primary | ICD-10-CM

## 2023-06-17 LAB
ALBUMIN SERPL-MCNC: 3.8 G/DL (ref 3.5–5.2)
ALBUMIN/GLOB SERPL: 1.3 (ref 1.1–2.2)
ALP SERPL-CCNC: 70 U/L (ref 35–104)
ALT SERPL-CCNC: 5 U/L (ref 10–35)
ANION GAP SERPL CALC-SCNC: 10 MMOL/L (ref 5–15)
AST SERPL-CCNC: 14 U/L (ref 10–35)
BASOPHILS # BLD: 0 K/UL (ref 0–1)
BASOPHILS NFR BLD: 0 % (ref 0–1)
BILIRUB SERPL-MCNC: 0.2 MG/DL (ref 0.2–1)
BUN SERPL-MCNC: 16 MG/DL (ref 6–20)
BUN/CREAT SERPL: 19 (ref 12–20)
CALCIUM SERPL-MCNC: 8.8 MG/DL (ref 8.6–10)
CHLORIDE SERPL-SCNC: 105 MMOL/L (ref 98–107)
CO2 SERPL-SCNC: 26 MMOL/L (ref 22–29)
COMMENT:: NORMAL
CREAT SERPL-MCNC: 0.86 MG/DL (ref 0.5–0.9)
DIFFERENTIAL METHOD BLD: ABNORMAL
EOSINOPHIL # BLD: 0.1 K/UL (ref 0–0.4)
EOSINOPHIL NFR BLD: 1 %
ERYTHROCYTE [DISTWIDTH] IN BLOOD BY AUTOMATED COUNT: 11.7 % (ref 11.5–14.5)
GLOBULIN SER CALC-MCNC: 3 G/DL (ref 2–4)
GLUCOSE SERPL-MCNC: 97 MG/DL (ref 65–100)
HCT VFR BLD AUTO: 33.3 % (ref 35–47)
HGB BLD-MCNC: 11.1 G/DL (ref 11.5–16)
IMM GRANULOCYTES # BLD AUTO: 0 K/UL (ref 0–0.04)
IMM GRANULOCYTES NFR BLD AUTO: 0 % (ref 0–0.5)
LYMPHOCYTES # BLD: 1.6 K/UL (ref 0.8–3.5)
LYMPHOCYTES NFR BLD: 19 % (ref 12–49)
MCH RBC QN AUTO: 32.5 PG (ref 26–34)
MCHC RBC AUTO-ENTMCNC: 33.3 G/DL (ref 30–36.5)
MCV RBC AUTO: 97.4 FL (ref 80–99)
MONOCYTES # BLD: 0.6 K/UL (ref 0–1)
MONOCYTES NFR BLD: 7 % (ref 5–13)
NEUTS SEG # BLD: 5.9 K/UL (ref 1.8–8)
NEUTS SEG NFR BLD: 73 % (ref 32–75)
NRBC # BLD: 0 K/UL (ref 0–0.01)
NRBC BLD-RTO: 0 PER 100 WBC
PLATELET # BLD AUTO: 286 K/UL (ref 150–400)
PMV BLD AUTO: 10.8 FL (ref 8.9–12.9)
POTASSIUM SERPL-SCNC: 4.1 MMOL/L (ref 3.5–5.1)
PROT SERPL-MCNC: 6.8 G/DL (ref 6.4–8.3)
RBC # BLD AUTO: 3.42 M/UL (ref 3.8–5.2)
SODIUM SERPL-SCNC: 141 MMOL/L (ref 136–145)
SPECIMEN HOLD: NORMAL
TROPONIN I BLD-MCNC: <0.04 NG/ML (ref 0–0.08)
WBC # BLD AUTO: 8.1 K/UL (ref 3.6–11)

## 2023-06-17 PROCEDURE — 96360 HYDRATION IV INFUSION INIT: CPT

## 2023-06-17 PROCEDURE — 36415 COLL VENOUS BLD VENIPUNCTURE: CPT

## 2023-06-17 PROCEDURE — 93005 ELECTROCARDIOGRAM TRACING: CPT | Performed by: EMERGENCY MEDICINE

## 2023-06-17 PROCEDURE — 2580000003 HC RX 258: Performed by: EMERGENCY MEDICINE

## 2023-06-17 PROCEDURE — 99284 EMERGENCY DEPT VISIT MOD MDM: CPT

## 2023-06-17 PROCEDURE — 70450 CT HEAD/BRAIN W/O DYE: CPT

## 2023-06-17 PROCEDURE — 80053 COMPREHEN METABOLIC PANEL: CPT

## 2023-06-17 PROCEDURE — 84484 ASSAY OF TROPONIN QUANT: CPT

## 2023-06-17 PROCEDURE — 96361 HYDRATE IV INFUSION ADD-ON: CPT

## 2023-06-17 PROCEDURE — 85025 COMPLETE CBC W/AUTO DIFF WBC: CPT

## 2023-06-17 RX ORDER — ESTRADIOL 0.03 MG/D
1 FILM, EXTENDED RELEASE TRANSDERMAL
COMMUNITY
End: 2023-06-17

## 2023-06-17 RX ORDER — NITROGLYCERIN 80 MG/1
0.25 PATCH TRANSDERMAL DAILY
COMMUNITY
Start: 2023-06-14 | End: 2023-06-17 | Stop reason: SINTOL

## 2023-06-17 RX ORDER — 0.9 % SODIUM CHLORIDE 0.9 %
20 INTRAVENOUS SOLUTION INTRAVENOUS ONCE
Status: COMPLETED | OUTPATIENT
Start: 2023-06-17 | End: 2023-06-17

## 2023-06-17 RX ORDER — ATORVASTATIN CALCIUM 10 MG/1
10 TABLET, FILM COATED ORAL DAILY
COMMUNITY
Start: 2023-06-12

## 2023-06-17 RX ADMIN — SODIUM CHLORIDE 1670 ML: 9 INJECTION, SOLUTION INTRAVENOUS at 10:37

## 2023-06-17 ASSESSMENT — PAIN - FUNCTIONAL ASSESSMENT: PAIN_FUNCTIONAL_ASSESSMENT: NONE - DENIES PAIN

## 2023-06-17 ASSESSMENT — ENCOUNTER SYMPTOMS
CHEST TIGHTNESS: 0
VOMITING: 0
NAUSEA: 0
DIARRHEA: 0
SHORTNESS OF BREATH: 0

## 2023-06-17 ASSESSMENT — LIFESTYLE VARIABLES
HOW MANY STANDARD DRINKS CONTAINING ALCOHOL DO YOU HAVE ON A TYPICAL DAY: PATIENT DOES NOT DRINK
HOW OFTEN DO YOU HAVE A DRINK CONTAINING ALCOHOL: NEVER

## 2023-06-17 NOTE — ED TRIAGE NOTES
Patient to the ED with her  and daughter fainting denied loss of consciousness with confusion. Smoked marijuana last night for the first time in six weeks. Recently started nitroglycerin for tendonitis in feet and wrist.     Patient advised that she hit her head.

## 2023-06-17 NOTE — ED NOTES
Pt given discharge instructions, patient education, 0 prescriptions and follow-up information. Pt verbalizing understanding. All questions answered. Pt discharge to home in private vehicle, ambulatory. Pt A&Ox4, RA, pain controlled.         Clover Marie RN  06/17/23 [id number]

## 2023-06-17 NOTE — ED NOTES
Multiple ED nurses unable to place and IV. This nurse place a 20 in the right AC and it infiltrated. Labs delayed.      Issac Chappell RN  06/17/23 1006

## 2023-06-17 NOTE — ED PROVIDER NOTES
[facility]  EMERGENCY DEPARTMENT ENCOUNTER    Pt Name: Dave Joseph  MRN: [id number]  [place] 1965  Date of evaluation: 6/17/2023  Provider: Jessa Pierce MD  [address] [place] [address]       Chief Complaint   Patient presents with    Loss of Consciousness     Denied LOC     HISTORY OF PRESENT ILLNESS   (Location/Symptom, Timing/Onset, Context/Setting, Quality, Duration, Modifying Factors, Severity)  Note limiting factors. HPI    53-year-old female here with syncope and head trauma occurring just prior to arrival.  Patient states that she has been on nitroglycerin patch for foot tendinitis for about 1 week. She had a headache for about 3 days after starting it. Patient states that she smoked marijuana for the first time in a long time last night. This morning, she got up to go use the bathroom and was feeling lightheaded. She urinated then got up quickly to get to her bed and passed out. She struck the right side of her head. Positive LOC.  took her blood pressure initially and systolic blood pressure was in the 80s. It then improved. Not witnessed but  was in bedroom asleep. He heard her fall. Denies any chest pain, shortness of breath, focal weakness or numbness, neck pain or other complaints. Additional history from independent historians:     Review of External Medical Records:     Nursing Notes were reviewed. REVIEW OF SYSTEMS  Review of Systems   Constitutional:  Negative for chills and fever. HENT:  Negative for congestion. Respiratory:  Negative for chest tightness and shortness of breath. Cardiovascular:  Negative for chest pain. Gastrointestinal:  Negative for diarrhea, nausea and vomiting. Neurological:  Positive for syncope and headaches.      PAST MEDICAL HISTORY     Past Medical History:   Diagnosis Date    Migraine      SURGICAL HISTORY       Past Surgical History:   Procedure Laterality Date    GYN  2010    hysterectomy     CURRENT

## 2023-06-18 LAB
EKG ATRIAL RATE: 83 BPM
EKG DIAGNOSIS: NORMAL
EKG P AXIS: 64 DEGREES
EKG P-R INTERVAL: 156 MS
EKG Q-T INTERVAL: 346 MS
EKG QRS DURATION: 92 MS
EKG QTC CALCULATION (BAZETT): 406 MS
EKG R AXIS: -4 DEGREES
EKG T AXIS: 28 DEGREES
EKG VENTRICULAR RATE: 83 BPM

## 2023-06-18 PROCEDURE — 93010 ELECTROCARDIOGRAM REPORT: CPT | Performed by: SPECIALIST

## 2023-06-23 ENCOUNTER — TELEPHONE (OUTPATIENT)
Age: 58
End: 2023-06-23

## 2023-06-30 ENCOUNTER — TELEPHONE (OUTPATIENT)
Age: 58
End: 2023-06-30

## 2023-06-30 RX ORDER — ERENUMAB-AOOE 140 MG/ML
140 INJECTION, SOLUTION SUBCUTANEOUS
Qty: 1 ML | Refills: 5 | Status: SHIPPED | OUTPATIENT
Start: 2023-06-30

## 2023-06-30 RX ORDER — RIMEGEPANT SULFATE 75 MG/75MG
TABLET, ORALLY DISINTEGRATING ORAL
Qty: 8 TABLET | Refills: 5 | Status: SHIPPED | OUTPATIENT
Start: 2023-06-30

## 2023-08-28 ENCOUNTER — TELEPHONE (OUTPATIENT)
Age: 58
End: 2023-08-28

## 2023-08-28 DIAGNOSIS — G43.011 INTRACTABLE MIGRAINE WITHOUT AURA AND WITH STATUS MIGRAINOSUS: Primary | ICD-10-CM

## 2023-08-28 NOTE — TELEPHONE ENCOUNTER
Patient is calling in to inform NP Antwon that she is waking up with headaches. States that its been happening every morning for the last 2 weeks. Only lasts for 5-10 min then goes away. Please contact with any questions.

## 2023-09-01 RX ORDER — PREDNISONE 10 MG/1
TABLET ORAL
Qty: 42 TABLET | Refills: 0 | Status: SHIPPED | OUTPATIENT
Start: 2023-09-01

## 2023-09-07 RX ORDER — FLUCONAZOLE 150 MG/1
150 TABLET ORAL
Qty: 2 TABLET | Refills: 0 | Status: SHIPPED | OUTPATIENT
Start: 2023-09-07

## 2023-09-07 RX ORDER — PREDNISONE 10 MG/1
TABLET ORAL
Qty: 42 TABLET | Refills: 0 | Status: SHIPPED | OUTPATIENT
Start: 2023-09-07

## 2023-10-02 ENCOUNTER — OFFICE VISIT (OUTPATIENT)
Age: 58
End: 2023-10-02
Payer: COMMERCIAL

## 2023-10-02 VITALS
OXYGEN SATURATION: 96 % | WEIGHT: 172 LBS | BODY MASS INDEX: 27.64 KG/M2 | HEART RATE: 79 BPM | HEIGHT: 66 IN | SYSTOLIC BLOOD PRESSURE: 114 MMHG | DIASTOLIC BLOOD PRESSURE: 86 MMHG

## 2023-10-02 DIAGNOSIS — F07.81 POST CONCUSSIVE SYNDROME: ICD-10-CM

## 2023-10-02 DIAGNOSIS — G43.011 INTRACTABLE MIGRAINE WITHOUT AURA AND WITH STATUS MIGRAINOSUS: Primary | ICD-10-CM
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
## 2023-10-02 DIAGNOSIS — M48.02 CERVICAL STENOSIS OF SPINAL CANAL: ICD-10-CM

## 2023-10-02 PROCEDURE — 99215 OFFICE O/P EST HI 40 MIN: CPT | Performed by: NURSE PRACTITIONER

## 2023-10-02 RX ORDER — AMITRIPTYLINE HYDROCHLORIDE 25 MG/1
25 TABLET, FILM COATED ORAL NIGHTLY
Qty: 90 TABLET | Refills: 1 | Status: SHIPPED | OUTPATIENT
Start: 2023-10-02

## 2023-10-02 NOTE — PROGRESS NOTES
Given med by foot , was having HA on new med  Was feeling faint, black out, hit head taken to ER  Since has been having daily HA, wking up with HA, said when laying down for a while HA goes away  Took prednisone, helped for 2 days but HA came  Currently has them 3-4 MHA days weekly  Nurtec prn works half the time  exertion triggers mha

## 2023-10-03 ENCOUNTER — TELEPHONE (OUTPATIENT)
Age: 58
End: 2023-10-03

## 2023-10-03 DIAGNOSIS — R42 DIZZINESS AND GIDDINESS: ICD-10-CM

## 2023-10-03 DIAGNOSIS — F07.81 POST CONCUSSIVE SYNDROME: ICD-10-CM
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
## 2023-10-03 NOTE — TELEPHONE ENCOUNTER
Patient is calling to see if she would be able to get an MRI for her head/neck since. Please contact to advise.

## 2023-10-03 NOTE — PROGRESS NOTES
Char Arenas is a 66 y.o. female who presents with the following  Chief Complaint   Patient presents with    Follow-up       HPI    FU for migraines, fall, syncope  She states since the fall in June she has been having significant headaches and pain. She is Aimovig 140   Doing well   Has been having a big increase since the fall   She does not remember much   Had a CT head. 3-4 a week right now. Nurtec PRN - works most of the time. Was feeling faint, black out, hit head taken to ER  Since has been having daily HA, wking up with HA, said when laying down for a while HA goes away  Took prednisone, helped for 2 days but HA came  exertion triggers mha     Cervical spine is causing a lot of problems. She has arm weakness. Headaches are coming from here  Paresthesia, pain and tingling   Post fall is worse. Allergies   Allergen Reactions    Sulfa Antibiotics Other (See Comments)     Other reaction(s): Unknown (comments)  Broke out in a rash in her mouth  Broke out in a rash in her mouth    Other reaction(s): Rash in mouth  Other reaction(s): Rash in mouth       Current Outpatient Medications   Medication Sig Dispense Refill    amitriptyline (ELAVIL) 25 MG tablet Take 1 tablet by mouth nightly 90 tablet 1    Erenumab-aooe (AIMOVIG) 140 MG/ML SOAJ Inject 140 mg into the skin every 30 days 1 mL 5    Rimegepant Sulfate (NURTEC) 75 MG TBDP Take 1 tablet by mouth at HA onset PRN. Max 1 tablet in 24hours. 8 tablet 5    atorvastatin (LIPITOR) 10 MG tablet Take 1 tablet by mouth daily      vitamin D 25 MCG (1000 UT) CAPS Take 1 capsule by mouth daily      estradiol (ESTRACE) 0.5 MG tablet ceived the following from [facility]: Outside name: estradiol (ESTRACE) 0.5 mg tablet      zolpidem (AMBIEN) 10 MG tablet Take 1 tablet by mouth. No current facility-administered medications for this visit.         Social History     Tobacco Use   Smoking Status Never   Smokeless Tobacco Never

## 2023-10-10 ENCOUNTER — TELEPHONE (OUTPATIENT)
Age: 58
End: 2023-10-10

## 2023-10-16 ENCOUNTER — TELEPHONE (OUTPATIENT)
Age: 58
End: 2023-10-16

## 2023-10-19 ENCOUNTER — TELEPHONE (OUTPATIENT)
Age: 58
End: 2023-10-19

## 2023-10-19 NOTE — TELEPHONE ENCOUNTER
He's calling for an order for MRI of the Brain/Cervical Spine and Lumbar Spine    Fax order to : [facility]  [phone]

## 2023-11-03 ENCOUNTER — TELEPHONE (OUTPATIENT)
Age: 58
End: 2023-11-03

## 2023-11-03 RX ORDER — RIMEGEPANT SULFATE 75 MG/75MG
TABLET, ORALLY DISINTEGRATING ORAL
Qty: 24 TABLET | Refills: 1 | Status: SHIPPED | OUTPATIENT
Start: 2023-11-03

## 2023-11-07 NOTE — TELEPHONE ENCOUNTER
Patient calling back checking on the status of her PA request for Aimovig. She stated being late as of today taking this medication.

## 2023-11-09 ENCOUNTER — TELEPHONE (OUTPATIENT)
Age: 58
End: 2023-11-09

## 2023-11-09 DIAGNOSIS — M48.02 CERVICAL STENOSIS OF SPINAL CANAL: Primary | ICD-10-CM

## 2023-11-09 DIAGNOSIS — R29.898 ARM WEAKNESS: ICD-10-CM

## 2023-11-09 NOTE — TELEPHONE ENCOUNTER
Aimovig PA submitted via CoverMyMeds   Key: [id number] - PA Case ID: [id number] - Rx #: [id number]   Awaiting outcome

## 2023-11-13 NOTE — TELEPHONE ENCOUNTER
Spoke with patient. She has Katherine Capellan only now. The emgality os over $400/ fill. And the relpax and ubrelvy don't do anything. The zomig works for the headache, but makes her feel really bad, so only ises that when she is desperate. Aske patient to to go [employer] and check into co-pay card. Will check with Susie Marcano un his return to see if he has any other suggestions. EMT/paramedic

## 2023-11-30 RX ORDER — ERENUMAB-AOOE 140 MG/ML
140 INJECTION, SOLUTION SUBCUTANEOUS
Qty: 1 ML | Refills: 5 | Status: SHIPPED | OUTPATIENT
Start: 2023-11-30

## 2023-12-21 ENCOUNTER — TELEPHONE (OUTPATIENT)
Age: 58
End: 2023-12-21

## 2024-02-14 ENCOUNTER — OFFICE VISIT (OUTPATIENT)
Age: 59
End: 2024-02-14
Payer: COMMERCIAL

## 2024-02-14 VITALS — DIASTOLIC BLOOD PRESSURE: 74 MMHG | SYSTOLIC BLOOD PRESSURE: 116 MMHG | HEART RATE: 84 BPM

## 2024-02-14 DIAGNOSIS — G43.011 INTRACTABLE MIGRAINE WITHOUT AURA AND WITH STATUS MIGRAINOSUS: Primary | ICD-10-CM

## 2024-02-14 DIAGNOSIS — M48.02 CERVICAL STENOSIS OF SPINAL CANAL: ICD-10-CM

## 2024-02-14 PROCEDURE — 99214 OFFICE O/P EST MOD 30 MIN: CPT | Performed by: NURSE PRACTITIONER

## 2024-02-14 RX ORDER — ERENUMAB-AOOE 140 MG/ML
140 INJECTION, SOLUTION SUBCUTANEOUS
Qty: 1 ML | Refills: 5 | Status: SHIPPED | OUTPATIENT
Start: 2024-02-14

## 2024-02-14 NOTE — PROGRESS NOTES
Rossy Jordan is a 59 y.o. female who presents with the following  Chief Complaint   Patient presents with    Follow-up    Results       HPI      FU for migraines  Had been getting WALTER lumbar with orthopedics   Told her no other shots, even no Aimovig.   She has been off and noticed increasing migraines.      She is Aimovig 140 and was doing well   3-4 a week right now bc she has been off  On it she does really well at 1-2 a month   Nurtec PRN - works most of the time.      Ubrelvy failed.   Never used Zavegepant   Allergies   Allergen Reactions    Sulfa Antibiotics Other (See Comments)     Other reaction(s): Unknown (comments)  Broke out in a rash in her mouth  Broke out in a rash in her mouth    Other reaction(s): Rash in mouth  Other reaction(s): Rash in mouth       Current Outpatient Medications   Medication Sig Dispense Refill    Erenumab-aooe (AIMOVIG) 140 MG/ML SOAJ Inject 140 mg into the skin every 30 days 1 mL 5    Rimegepant Sulfate (NURTEC) 75 MG TBDP TAKE 1 TABLET BY MOUTH AT ONSET OF HEADACHE AS NEEDED 24 tablet 1    atorvastatin (LIPITOR) 10 MG tablet Take 1 tablet by mouth daily      vitamin D 25 MCG (1000 UT) CAPS Take 1 capsule by mouth daily      estradiol (ESTRACE) 0.5 MG tablet ceived the following from [facility]: Outside name: estradiol (ESTRACE) 0.5 mg tablet      zolpidem (AMBIEN) 10 MG tablet Take 1 tablet by mouth.       No current facility-administered medications for this visit.        Social History     Tobacco Use   Smoking Status Never   Smokeless Tobacco Never       Past Medical History:   Diagnosis Date    Migraine        Past Surgical History:   Procedure Laterality Date    GYN  2010    hysterectomy       Family History   Problem Relation Age of Onset    Diabetes Mother     Cancer Father        Social History     Socioeconomic History    Marital status:    Tobacco Use    Smoking status: Never    Smokeless tobacco: Never   Substance and Sexual

## 2024-02-14 NOTE — PROGRESS NOTES
Said since she is getting injections in back was told she can do any other injections   Increased MHA due to this, has put her behind

## 2024-05-30 DIAGNOSIS — R42 DIZZINESS AND GIDDINESS: ICD-10-CM

## 2024-05-30 DIAGNOSIS — F07.81 POST CONCUSSIVE SYNDROME: ICD-10-CM
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
## 2024-06-26 ENCOUNTER — TELEPHONE (OUTPATIENT)
Age: 59
End: 2024-06-26

## 2024-06-26 NOTE — TELEPHONE ENCOUNTER
Patient is having psychotic issues, memory loss, forgetfulness    Requesting Dr. Mukherjee or Dr. Alcantara

## 2024-08-06 DIAGNOSIS — G43.709 CHRONIC MIGRAINE WITHOUT AURA WITHOUT STATUS MIGRAINOSUS, NOT INTRACTABLE: Primary | ICD-10-CM

## 2024-08-06 RX ORDER — ERENUMAB-AOOE 140 MG/ML
140 INJECTION, SOLUTION SUBCUTANEOUS
Qty: 1 ML | Refills: 5 | Status: SHIPPED | OUTPATIENT
Start: 2024-08-06

## 2024-08-21 ENCOUNTER — OFFICE VISIT (OUTPATIENT)
Age: 59
End: 2024-08-21
Payer: COMMERCIAL

## 2024-08-21 VITALS
HEART RATE: 88 BPM | BODY MASS INDEX: 25.55 KG/M2 | HEIGHT: 66 IN | DIASTOLIC BLOOD PRESSURE: 74 MMHG | SYSTOLIC BLOOD PRESSURE: 124 MMHG | OXYGEN SATURATION: 99 % | RESPIRATION RATE: 16 BRPM | WEIGHT: 159 LBS | TEMPERATURE: 97.3 F

## 2024-08-21 DIAGNOSIS — G43.011 INTRACTABLE MIGRAINE WITHOUT AURA AND WITH STATUS MIGRAINOSUS: Primary | ICD-10-CM

## 2024-08-21 PROCEDURE — 99214 OFFICE O/P EST MOD 30 MIN: CPT | Performed by: NURSE PRACTITIONER

## 2024-08-21 RX ORDER — BUTALBITAL, ACETAMINOPHEN AND CAFFEINE 50; 325; 40 MG/1; MG/1; MG/1
TABLET ORAL
Qty: 60 TABLET | Refills: 4 | Status: SHIPPED | OUTPATIENT
Start: 2024-08-21

## 2024-08-21 RX ORDER — BUPROPION HYDROCHLORIDE 150 MG/1
150 TABLET ORAL EVERY MORNING
COMMUNITY

## 2024-08-21 RX ORDER — CELECOXIB 200 MG/1
50 CAPSULE ORAL DAILY
COMMUNITY

## 2024-08-21 NOTE — PROGRESS NOTES
Rossy Jordan is a 59 y.o. female who presents with the following  Chief Complaint   Patient presents with    Migraine     Patient states aimovig is helping and she has had 5-6 migraines and 2-3 really bad in the last month.       HPI       FU for migraines     She is Aimovig 140 and working well   Was off due to insurance and having 15 or more a month.  Now she is back on and having about 2-3 migraines a month   Significantly better then she was before this.   Using Nurtec for mild migraines  Ubrelvy does not help.  Scared of nasal spray   Can not take triptans.   Never used Fioricet.     Migraines are debilitating, unilateral on the right side usually  Hypersensitivity light sound and smell    She is still having some cognitive issues and is set to see neuro psych in March 2025        Allergies   Allergen Reactions    Sulfa Antibiotics Other (See Comments)     Other reaction(s): Unknown (comments)  Broke out in a rash in her mouth  Broke out in a rash in her mouth    Other reaction(s): Rash in mouth  Other reaction(s): Rash in mouth       Current Outpatient Medications   Medication Sig Dispense Refill    celecoxib (CELEBREX) 200 MG capsule Take 50 mg by mouth daily      buPROPion (WELLBUTRIN XL) 150 MG extended release tablet Take 1 tablet by mouth every morning      butalbital-acetaminophen-caffeine (FIORICET, ESGIC) -40 MG per tablet 1-2 tablets by mouth every 8 hours PRN for migraine up to 3 days a week. 60 tablet 4    Erenumab-aooe (AIMOVIG) 140 MG/ML SOAJ Inject 140 mg into the skin every 30 days 1 mL 5    Rimegepant Sulfate (NURTEC) 75 MG TBDP TAKE 1 TABLET BY MOUTH AT ONSET OF HEADACHE AS NEEDED 24 tablet 1    atorvastatin (LIPITOR) 10 MG tablet Take 1 tablet by mouth daily      vitamin D 25 MCG (1000 UT) CAPS Take 1 capsule by mouth daily      estradiol (ESTRACE) 0.5 MG tablet ceived the following from [facility]: Outside name: estradiol (ESTRACE) 0.5 mg tablet

## 2024-09-09 NOTE — ED TRIAGE NOTES
-- DO NOT REPLY / DO NOT REPLY ALL --  -- This inbox is not monitored. If this was sent to the wrong provider or department, reroute message to [place] pool. --  -- Message is from Engagement Center Operations (ECO) --    General Patient Message: REQUESTING CALL BACK FOR ADDITIONAL QUESTIONS REGARDING  insulin glargine (LANTUS) 100 UNIT/ML vial solution   Caller Information       Contact Date/Time Type Contact Phone/Fax    09/09/2024 08:06 AM CDT Phone (Incoming) Shira BELLO (Other) [phone]     Formerly [facility]               Pt presents to the ED with c/o left sided back pain that started yesterday. Pt thinks it may be gas because she had a hard bowel movement yesterday and has not passed any more flatus. Pt has tried a \"gas chewable\", muscle relaxor, and pepto bismal, but no relief. Denies any urinary sx.

## 2024-11-07 ENCOUNTER — TELEPHONE (OUTPATIENT)
Age: 59
End: 2024-11-07

## 2024-11-07 NOTE — TELEPHONE ENCOUNTER
Patient requesting a call to discuss her migraines headaches and to discuss will getting tinted windows help.

## 2024-11-20 NOTE — TELEPHONE ENCOUNTER
Patient is requesting a call. Did confirm number but pt stated she did not receive a call.     Please contact. Dial twice as sometimes calls do not go through for her.

## 2024-11-20 NOTE — TELEPHONE ENCOUNTER
Patient reports having 2 migraines a month. She is requesting to get the DMV form filled out for tint. I was unable to finish call due to the call breaking up so bad. I told her I would send a message in [employer].

## 2024-12-28 ENCOUNTER — APPOINTMENT (OUTPATIENT)
Facility: HOSPITAL | Age: 59
End: 2024-12-28
Payer: COMMERCIAL

## 2024-12-28 ENCOUNTER — HOSPITAL ENCOUNTER (EMERGENCY)
Facility: HOSPITAL | Age: 59
Discharge: HOME | End: 2024-12-28
Attending: STUDENT IN AN ORGANIZED HEALTH CARE EDUCATION/TRAINING PROGRAM
Payer: COMMERCIAL

## 2024-12-28 VITALS
DIASTOLIC BLOOD PRESSURE: 84 MMHG | TEMPERATURE: 99 F | SYSTOLIC BLOOD PRESSURE: 114 MMHG | HEIGHT: 66 IN | WEIGHT: 163 LBS | BODY MASS INDEX: 26.2 KG/M2 | RESPIRATION RATE: 20 BRPM | OXYGEN SATURATION: 99 % | HEART RATE: 82 BPM

## 2024-12-28 DIAGNOSIS — J10.1 INFLUENZA A: Primary | ICD-10-CM

## 2024-12-28 LAB
FLUAV RNA SPEC QL NAA+PROBE: DETECTED
FLUBV RNA SPEC QL NAA+PROBE: NOT DETECTED
SARS-COV-2 RNA RESP QL NAA+PROBE: NOT DETECTED
SOURCE: ABNORMAL

## 2024-12-28 PROCEDURE — 6370000000 HC RX 637 (ALT 250 FOR IP): Performed by: STUDENT IN AN ORGANIZED HEALTH CARE EDUCATION/TRAINING PROGRAM

## 2024-12-28 PROCEDURE — 87636 SARSCOV2 & INF A&B AMP PRB: CPT

## 2024-12-28 PROCEDURE — 71046 X-RAY EXAM CHEST 2 VIEWS: CPT

## 2024-12-28 PROCEDURE — 99284 EMERGENCY DEPT VISIT MOD MDM: CPT

## 2024-12-28 RX ORDER — ONDANSETRON 4 MG/1
4 TABLET, ORALLY DISINTEGRATING ORAL ONCE
Status: COMPLETED | OUTPATIENT
Start: 2024-12-28 | End: 2024-12-28

## 2024-12-28 RX ORDER — IBUPROFEN 600 MG/1
600 TABLET, FILM COATED ORAL 3 TIMES DAILY PRN
Qty: 30 TABLET | Refills: 0 | Status: SHIPPED | OUTPATIENT
Start: 2024-12-28

## 2024-12-28 RX ORDER — BENZONATATE 100 MG/1
100 CAPSULE ORAL 2 TIMES DAILY PRN
Qty: 20 CAPSULE | Refills: 0 | Status: SHIPPED | OUTPATIENT
Start: 2024-12-28 | End: 2025-01-07

## 2024-12-28 RX ORDER — IBUPROFEN 600 MG/1
600 TABLET, FILM COATED ORAL
Status: COMPLETED | OUTPATIENT
Start: 2024-12-28 | End: 2024-12-28

## 2024-12-28 RX ORDER — ONDANSETRON 4 MG/1
4 TABLET, ORALLY DISINTEGRATING ORAL 3 TIMES DAILY PRN
Qty: 21 TABLET | Refills: 0 | Status: SHIPPED | OUTPATIENT
Start: 2024-12-28

## 2024-12-28 RX ORDER — OSELTAMIVIR PHOSPHATE 75 MG/1
75 CAPSULE ORAL 2 TIMES DAILY
Qty: 10 CAPSULE | Refills: 0 | Status: SHIPPED | OUTPATIENT
Start: 2024-12-28 | End: 2025-01-02

## 2024-12-28 RX ADMIN — ONDANSETRON 4 MG: 4 TABLET, ORALLY DISINTEGRATING ORAL at 23:31

## 2024-12-28 RX ADMIN — IBUPROFEN 600 MG: 600 TABLET, FILM COATED ORAL at 23:31

## 2024-12-28 ASSESSMENT — PAIN - FUNCTIONAL ASSESSMENT: PAIN_FUNCTIONAL_ASSESSMENT: NONE - DENIES PAIN

## 2025-01-03 NOTE — ED PROVIDER NOTES
[facility] EMERGENCY DEPT  EMERGENCY DEPARTMENT ENCOUNTER      Pt Name: Rossy Jordan  MRN: [id number]  Birthdate 1965  Date of evaluation: 12/28/2024  Provider: Sol Balderrama MD    CHIEF COMPLAINT     No chief complaint on file.        HISTORY OF PRESENT ILLNESS    Nursing Triage Notes were reviewed.    HPI    Rossy Jordan is a 59 y.o. female with a history of migraines, chronic sciatica, tendinitis who presents to the emergency department for evaluation of cough.  States she felt generally unwell and had a cough on Friday.  Tried taking Tylenol and TheraFlu.  Patient complains that she has had general malaise, myalgias, headache, rhinorrhea, and cough since Saturday morning.  Denies any associated chest pain. Complains of some dyspnea with coughing fits. Complains she has had some ongoing nausea, but denies significant abdominal pain.  Denies genitourinary symptoms.  Denies known sick contacts.        PAST MEDICAL HISTORY     Past Medical History:   Diagnosis Date    Migraine          SURGICAL HISTORY       Past Surgical History:   Procedure Laterality Date    GYN  2010    hysterectomy         CURRENT MEDICATIONS       Discharge Medication List as of 12/28/2024 11:10 PM        CONTINUE these medications which have NOT CHANGED    Details   celecoxib (CELEBREX) 200 MG capsule Take 50 mg by mouth dailyHistorical Med      buPROPion (WELLBUTRIN XL) 150 MG extended release tablet Take 1 tablet by mouth every morningHistorical Med      butalbital-acetaminophen-caffeine (FIORICET, ESGIC) -40 MG per tablet 1-2 tablets by mouth every 8 hours PRN for migraine up to 3 days a week., Disp-60 tablet, R-4Normal      Erenumab-aooe (AIMOVIG) 140 MG/ML SOAJ Inject 140 mg into the skin every 30 days, Disp-1 mL, R-5Normal      Rimegepant Sulfate (NURTEC) 75 MG TBDP TAKE 1 TABLET BY MOUTH AT ONSET OF HEADACHE AS NEEDED, Disp-24 tablet, R-1Normal      atorvastatin (LIPITOR) 10 MG tablet Take 1 tablet by mouth

## 2025-01-08 RX ORDER — RIMEGEPANT SULFATE 75 MG/75MG
TABLET, ORALLY DISINTEGRATING ORAL
Qty: 24 TABLET | Refills: 1 | Status: SHIPPED | OUTPATIENT
Start: 2025-01-08

## 2025-02-27 ENCOUNTER — TELEPHONE (OUTPATIENT)
Age: 60
End: 2025-02-27

## 2025-02-27 NOTE — TELEPHONE ENCOUNTER
Please cancel all three appts with Dr. Alcantara.  Patient states she can't afford those visits at this time.    Thanks.

## 2025-03-05 ENCOUNTER — OFFICE VISIT (OUTPATIENT)
Age: 60
End: 2025-03-05
Payer: COMMERCIAL

## 2025-03-05 VITALS
SYSTOLIC BLOOD PRESSURE: 124 MMHG | HEART RATE: 70 BPM | TEMPERATURE: 97.8 F | DIASTOLIC BLOOD PRESSURE: 78 MMHG | RESPIRATION RATE: 18 BRPM | OXYGEN SATURATION: 100 %

## 2025-03-05 DIAGNOSIS — G43.709 CHRONIC MIGRAINE WITHOUT AURA WITHOUT STATUS MIGRAINOSUS, NOT INTRACTABLE: ICD-10-CM

## 2025-03-05 DIAGNOSIS — G43.011 INTRACTABLE MIGRAINE WITHOUT AURA AND WITH STATUS MIGRAINOSUS: ICD-10-CM

## 2025-03-05 PROCEDURE — 99214 OFFICE O/P EST MOD 30 MIN: CPT | Performed by: NURSE PRACTITIONER

## 2025-03-05 RX ORDER — ERENUMAB-AOOE 140 MG/ML
140 INJECTION, SOLUTION SUBCUTANEOUS
Qty: 1 ML | Refills: 5 | Status: SHIPPED | OUTPATIENT
Start: 2025-03-05

## 2025-03-05 RX ORDER — RIMEGEPANT SULFATE 75 MG/75MG
TABLET, ORALLY DISINTEGRATING ORAL
Qty: 48 TABLET | Refills: 1 | Status: SHIPPED | OUTPATIENT
Start: 2025-03-05

## 2025-03-05 RX ORDER — METHYLPREDNISOLONE 4 MG/1
TABLET ORAL
Qty: 1 KIT | Refills: 0 | Status: SHIPPED | OUTPATIENT
Start: 2025-03-05 | End: 2025-03-11

## 2025-03-05 RX ORDER — BUTALBITAL, ACETAMINOPHEN AND CAFFEINE 50; 325; 40 MG/1; MG/1; MG/1
TABLET ORAL
Qty: 60 TABLET | Refills: 4 | Status: SHIPPED | OUTPATIENT
Start: 2025-03-05

## 2025-03-05 RX ORDER — CYCLOBENZAPRINE HCL 10 MG
10 TABLET ORAL 3 TIMES DAILY PRN
Qty: 21 TABLET | Refills: 0 | Status: SHIPPED | OUTPATIENT
Start: 2025-03-05 | End: 2025-03-08

## 2025-03-05 NOTE — PATIENT INSTRUCTIONS
Dr. Ospina  [facility] , [facility], [facility].

## 2025-03-05 NOTE — PROGRESS NOTES
(CELEBREX) 200 MG capsule Take 50 mg by mouth daily      atorvastatin (LIPITOR) 10 MG tablet Take 1 tablet by mouth daily      vitamin D 25 MCG (1000 UT) CAPS Take 1 capsule by mouth daily      estradiol (ESTRACE) 0.5 MG tablet ceived the following from [facility]: Outside name: estradiol (ESTRACE) 0.5 mg tablet      zolpidem (AMBIEN) 10 MG tablet Take 1 tablet by mouth.       No current facility-administered medications for this visit.        Social History     Tobacco Use   Smoking Status Never   Smokeless Tobacco Never       Past Medical History:   Diagnosis Date    Migraine        Past Surgical History:   Procedure Laterality Date    GYN  2010    hysterectomy       Family History   Problem Relation Age of Onset    Diabetes Mother     Cancer Father        Social History     Socioeconomic History    Marital status:      Spouse name: None    Number of children: None    Years of education: None    Highest education level: None   Tobacco Use    Smoking status: Never    Smokeless tobacco: Never   Substance and Sexual Activity    Alcohol use: Yes    Drug use: No       Review of Systems      Remainder of comprehensive review is negative.     Physical Exam :    /78   Pulse 70   Temp 97.8 °F (36.6 °C)   Resp 18   SpO2 100%     General: Well defined, nourished, and groomed individual in no acute distress.    Musculoskeletal: Extremities revealed no edema and had full range of motion of joints.    Psych: Good mood and bright affect    NEUROLOGICAL EXAMINATION:    Mental Status: Alert and oriented to person, place, and time    Cranial Nerves:    II, III, IV, VI: Visual acuity grossly intact. Visual fields are normal.    Pupils are equal, round, and reactive to light and accommodation.    Extra-ocular movements are full and fluid. Fundoscopic exam was benign, no ptosis or nystagmus.    V-XII: Hearing is grossly intact. Facial features are symmetric, with normal sensation and strength. The

## 2025-03-08 ENCOUNTER — HOSPITAL ENCOUNTER (EMERGENCY)
Facility: HOSPITAL | Age: 60
Discharge: HOME | End: 2025-03-08
Attending: STUDENT IN AN ORGANIZED HEALTH CARE EDUCATION/TRAINING PROGRAM
Payer: COMMERCIAL

## 2025-03-08 ENCOUNTER — APPOINTMENT (OUTPATIENT)
Facility: HOSPITAL | Age: 60
End: 2025-03-08
Payer: COMMERCIAL

## 2025-03-08 VITALS
WEIGHT: 167.55 LBS | DIASTOLIC BLOOD PRESSURE: 93 MMHG | SYSTOLIC BLOOD PRESSURE: 126 MMHG | BODY MASS INDEX: 26.93 KG/M2 | TEMPERATURE: 97.9 F | RESPIRATION RATE: 20 BRPM | OXYGEN SATURATION: 99 % | HEIGHT: 66 IN | HEART RATE: 81 BPM

## 2025-03-08 DIAGNOSIS — R07.89 CHEST WALL PAIN: Primary | ICD-10-CM

## 2025-03-08 DIAGNOSIS — E87.6 HYPOKALEMIA: ICD-10-CM

## 2025-03-08 DIAGNOSIS — E83.42 HYPOMAGNESEMIA: ICD-10-CM

## 2025-03-08 LAB
ALBUMIN SERPL-MCNC: 3.5 G/DL (ref 3.5–5)
ALBUMIN/GLOB SERPL: 0.9 (ref 1.1–2.2)
ALP SERPL-CCNC: 69 U/L (ref 45–117)
ALT SERPL-CCNC: 15 U/L (ref 12–78)
ANION GAP SERPL CALC-SCNC: 5 MMOL/L (ref 2–12)
AST SERPL-CCNC: 10 U/L (ref 15–37)
BASOPHILS # BLD: 0.05 K/UL (ref 0–0.1)
BASOPHILS NFR BLD: 0.6 % (ref 0–1)
BILIRUB SERPL-MCNC: 0.3 MG/DL (ref 0.2–1)
BUN SERPL-MCNC: 20 MG/DL (ref 6–20)
BUN/CREAT SERPL: 22 (ref 12–20)
CALCIUM SERPL-MCNC: 9 MG/DL (ref 8.5–10.1)
CHLORIDE SERPL-SCNC: 104 MMOL/L (ref 97–108)
CO2 SERPL-SCNC: 31 MMOL/L (ref 21–32)
CREAT SERPL-MCNC: 0.91 MG/DL (ref 0.55–1.02)
DIFFERENTIAL METHOD BLD: ABNORMAL
EOSINOPHIL # BLD: 0.04 K/UL (ref 0–0.4)
EOSINOPHIL NFR BLD: 0.5 % (ref 0–7)
ERYTHROCYTE [DISTWIDTH] IN BLOOD BY AUTOMATED COUNT: 11.5 % (ref 11.5–14.5)
GLOBULIN SER CALC-MCNC: 3.8 G/DL (ref 2–4)
GLUCOSE SERPL-MCNC: 101 MG/DL (ref 65–100)
HCT VFR BLD AUTO: 35.2 % (ref 35–47)
HGB BLD-MCNC: 12.2 G/DL (ref 11.5–16)
IMM GRANULOCYTES # BLD AUTO: 0.03 K/UL (ref 0–0.04)
IMM GRANULOCYTES NFR BLD AUTO: 0.4 % (ref 0–0.5)
LYMPHOCYTES # BLD: 1.85 K/UL (ref 0.8–3.5)
LYMPHOCYTES NFR BLD: 22.7 % (ref 12–49)
MAGNESIUM SERPL-MCNC: 1.6 MG/DL (ref 1.6–2.4)
MCH RBC QN AUTO: 32.8 PG (ref 26–34)
MCHC RBC AUTO-ENTMCNC: 34.7 G/DL (ref 30–36.5)
MCV RBC AUTO: 94.6 FL (ref 80–99)
MONOCYTES # BLD: 0.38 K/UL (ref 0–1)
MONOCYTES NFR BLD: 4.7 % (ref 5–13)
NEUTS SEG # BLD: 5.79 K/UL (ref 1.8–8)
NEUTS SEG NFR BLD: 71.1 % (ref 32–75)
NRBC # BLD: 0 K/UL (ref 0–0.01)
NRBC BLD-RTO: 0 PER 100 WBC
PLATELET # BLD AUTO: 263 K/UL (ref 150–400)
PMV BLD AUTO: 11.2 FL (ref 8.9–12.9)
POTASSIUM SERPL-SCNC: 3 MMOL/L (ref 3.5–5.1)
PROT SERPL-MCNC: 7.3 G/DL (ref 6.4–8.2)
RBC # BLD AUTO: 3.72 M/UL (ref 3.8–5.2)
SODIUM SERPL-SCNC: 140 MMOL/L (ref 136–145)
TROPONIN I SERPL HS-MCNC: 5 NG/L (ref 0–51)
TROPONIN I SERPL HS-MCNC: 6 NG/L (ref 0–51)
WBC # BLD AUTO: 8.1 K/UL (ref 3.6–11)

## 2025-03-08 PROCEDURE — 6360000002 HC RX W HCPCS: Performed by: STUDENT IN AN ORGANIZED HEALTH CARE EDUCATION/TRAINING PROGRAM

## 2025-03-08 PROCEDURE — 96374 THER/PROPH/DIAG INJ IV PUSH: CPT

## 2025-03-08 PROCEDURE — 83735 ASSAY OF MAGNESIUM: CPT

## 2025-03-08 PROCEDURE — 99285 EMERGENCY DEPT VISIT HI MDM: CPT

## 2025-03-08 PROCEDURE — 93005 ELECTROCARDIOGRAM TRACING: CPT | Performed by: STUDENT IN AN ORGANIZED HEALTH CARE EDUCATION/TRAINING PROGRAM

## 2025-03-08 PROCEDURE — 6370000000 HC RX 637 (ALT 250 FOR IP): Performed by: STUDENT IN AN ORGANIZED HEALTH CARE EDUCATION/TRAINING PROGRAM

## 2025-03-08 PROCEDURE — 36415 COLL VENOUS BLD VENIPUNCTURE: CPT

## 2025-03-08 PROCEDURE — 84484 ASSAY OF TROPONIN QUANT: CPT

## 2025-03-08 PROCEDURE — 80053 COMPREHEN METABOLIC PANEL: CPT

## 2025-03-08 PROCEDURE — 85025 COMPLETE CBC W/AUTO DIFF WBC: CPT

## 2025-03-08 PROCEDURE — 71045 X-RAY EXAM CHEST 1 VIEW: CPT

## 2025-03-08 RX ORDER — ACETAMINOPHEN 500 MG
1000 TABLET ORAL ONCE
Status: COMPLETED | OUTPATIENT
Start: 2025-03-08 | End: 2025-03-08

## 2025-03-08 RX ORDER — ACETAMINOPHEN 500 MG
1000 TABLET ORAL EVERY 6 HOURS PRN
Qty: 56 TABLET | Refills: 0 | Status: SHIPPED | OUTPATIENT
Start: 2025-03-08 | End: 2025-03-15

## 2025-03-08 RX ORDER — METHOCARBAMOL 500 MG/1
1000 TABLET, FILM COATED ORAL
Status: COMPLETED | OUTPATIENT
Start: 2025-03-08 | End: 2025-03-08

## 2025-03-08 RX ORDER — METHOCARBAMOL 500 MG/1
1000 TABLET, FILM COATED ORAL 3 TIMES DAILY PRN
Qty: 18 TABLET | Refills: 0 | Status: SHIPPED | OUTPATIENT
Start: 2025-03-08

## 2025-03-08 RX ORDER — LANOLIN ALCOHOL/MO/W.PET/CERES
400 CREAM (GRAM) TOPICAL ONCE
Status: COMPLETED | OUTPATIENT
Start: 2025-03-08 | End: 2025-03-08

## 2025-03-08 RX ORDER — IBUPROFEN 600 MG/1
600 TABLET, FILM COATED ORAL EVERY 6 HOURS PRN
Qty: 28 TABLET | Refills: 0 | Status: SHIPPED | OUTPATIENT
Start: 2025-03-08 | End: 2025-03-15

## 2025-03-08 RX ORDER — POTASSIUM CHLORIDE 750 MG/1
40 TABLET, EXTENDED RELEASE ORAL ONCE
Status: COMPLETED | OUTPATIENT
Start: 2025-03-08 | End: 2025-03-08

## 2025-03-08 RX ORDER — LIDOCAINE 4 G/G
1 PATCH TOPICAL
Status: DISCONTINUED | OUTPATIENT
Start: 2025-03-08 | End: 2025-03-08 | Stop reason: HOSPADM

## 2025-03-08 RX ORDER — KETOROLAC TROMETHAMINE 30 MG/ML
15 INJECTION, SOLUTION INTRAMUSCULAR; INTRAVENOUS
Status: COMPLETED | OUTPATIENT
Start: 2025-03-08 | End: 2025-03-08

## 2025-03-08 RX ADMIN — KETOROLAC TROMETHAMINE 15 MG: 30 INJECTION, SOLUTION INTRAMUSCULAR at 10:53

## 2025-03-08 RX ADMIN — METHOCARBAMOL TABLETS 1000 MG: 500 TABLET, COATED ORAL at 10:55

## 2025-03-08 RX ADMIN — ACETAMINOPHEN 1000 MG: 500 TABLET ORAL at 10:55

## 2025-03-08 RX ADMIN — POTASSIUM CHLORIDE 40 MEQ: 750 TABLET, EXTENDED RELEASE ORAL at 11:13

## 2025-03-08 RX ADMIN — Medication 400 MG: at 11:13

## 2025-03-08 ASSESSMENT — HEART SCORE: ECG: NON-SPECIFC REPOLARIZATION DISTURBANCE/LBTB/PM

## 2025-03-08 ASSESSMENT — PAIN DESCRIPTION - PAIN TYPE: TYPE: ACUTE PAIN

## 2025-03-08 ASSESSMENT — PAIN SCALES - GENERAL
PAINLEVEL_OUTOF10: 3
PAINLEVEL_OUTOF10: 5

## 2025-03-08 ASSESSMENT — PAIN DESCRIPTION - LOCATION: LOCATION: CHEST

## 2025-03-08 ASSESSMENT — PAIN DESCRIPTION - FREQUENCY: FREQUENCY: CONTINUOUS

## 2025-03-08 ASSESSMENT — PAIN - FUNCTIONAL ASSESSMENT
PAIN_FUNCTIONAL_ASSESSMENT: PREVENTS OR INTERFERES SOME ACTIVE ACTIVITIES AND ADLS
PAIN_FUNCTIONAL_ASSESSMENT: 0-10

## 2025-03-08 ASSESSMENT — PAIN DESCRIPTION - DESCRIPTORS: DESCRIPTORS: ACHING

## 2025-03-08 NOTE — ED PROVIDER NOTES
[place] EMERGENCY DEPARTMENT  EMERGENCY DEPARTMENT ENCOUNTER      Pt Name: Rossy Jordan  MRN: [id number]  Birthdate 1965  Date of evaluation: 3/8/2025  Provider: Nella Shine DO    CHIEF COMPLAINT       Chief Complaint   Patient presents with    Chest Pain       PMH   Past Medical History:   Diagnosis Date    Migraine          MDM:   Vitals:    Vitals:    03/08/25 1250   BP: (!) 126/93   Pulse: 81   Resp: 20   Temp:    SpO2: 99%           This is a 60 y.o. female with pmhx migraines who presents today for cc of chest pain . Patient was vacuuming extensively a few days ago, and the next day noted pain in her right upper chest, worse with movement or certain positions. Denies dyspnea, cough, fever, chills,nausea, vomiting. Told her neurologist at a routine appointment who provided rx for flexeril and steroids, which she feels hasn't helped much. Rates the pain 5/10 on the pain scale, sharp, nonradiating. Nonsmoker, no family history of early CAD in primary relatives. Has not taken any tylenol/ibuprofen today.      On arrival VS stable.     Physical Exam  General: Alert, no acute distress  HEENT: Normocephalic, atraumatic. EOMI, moist oral mucosa, no conjunctival injection  Neck: ROM normal, supple  Cardio: Heart regular rate and regular rhythm, cap refill <2seconds  Lungs: no respiratory distress, lungs CTAB, no wheezes, rhonchi or rales   MSK:ROM normal, no LE edema, chest wall tenderness right upper chest without crepitus or overlaying skin changes. 2+ radial and ulnar pulses b/l, 2+ PT pulses bl   Skin: Warm, dry, no rash  Neuro: No focal neurodeficits, AOx3     Heart score 1, low risk. Delta trop -1. CXR clear, EKG nonischemic. Doubt ACS.     Labs otherwise notable for mild hypomagnesemia and hypokalemia, repleted.     Patient re-evaluated after tylenol, toradol, robaxin and lido patch. Pain has now resolved. She feels well and is requesting discharge.    Low suspicion for more sinister  process given benign exam, reassuring workup, and resolved symptoms. Will have them follow up, close return precautions discussed, they are agreeable.     Diagnosis is chest wall pain, hypomagnesemia, hypokalemia. Disposition is Discharge with PCP follow-up. Workup and plan discussed with patient  and family , all questions answered and they are in agreement with the  plan .             ED Course as of 03/08/25 1852   Sat Mar 08, 2025   1038 EKG rate 77bpm, sinus rhythm, nonspecific St changes, no STEMI [MG]      ED Course User Index  [MG] Nella Shine DO         Total critical care time (not including time spent performing separately reportable procedures):         Review of external notes and Independent historians utilized in decision making: External notes reviewed includeOutside medication list     Diagnostics independently interpreted by me: EKG see ED course for interpretation    Discussions with other clinicians and healthcare agents:  None    Risks considered in patient's treatment plan: Prescription drug management - Rx tylenol, ibuprofen, robaxin        HISTORY OF PRESENT ILLNESS   (Location/Symptom, Timing/Onset, Context/Setting, Quality, Duration, Modifying Factors, Severity)  Note limiting factors.   See MDM    Nursing Notes were reviewed.    REVIEW OF SYSTEMS    (2-9 systems for level 4, 10 or more for level 5)   See MDM    PAST MEDICAL HISTORY     Past Medical History:   Diagnosis Date    Migraine        SURGICAL HISTORY       Past Surgical History:   Procedure Laterality Date    GYN  2010    hysterectomy    HYSTERECTOMY (CERVIX STATUS UNKNOWN)         CURRENT MEDICATIONS       Discharge Medication List as of 3/8/2025 12:44 PM        CONTINUE these medications which have NOT CHANGED    Details   cephALEXin (KEFLEX) 250 MG capsule Take 1 capsule by mouth 4 times dailyHistorical Med      butalbital-acetaminophen-caffeine (FIORICET, ESGIC) -40 MG per tablet 1-2 tablets by mouth every 8 hours

## 2025-03-08 NOTE — DISCHARGE INSTRUCTIONS
Take tylenol and ibuprofen in alternating doses every 3 hours to maximize their anti-inflammatory effects and best manage your pain. Keep 6 hours between doses of the same medication.    For example:   9a: Tylenol  12p: Ibuprofen  3p: Tylenol  6p: Ibuprofen  Etc.     Lidocaine patches are available over the counter at any pharmacy, ask the pharmacist for help locating them. The concentration of medication should be between 3-4%, and I recommend staying away from products containing menthol due to skin irritation. You can apply a patch for 12 hours, take it off for 12 hours, and repeat the next day.     You can also take the muscle relaxer, robaxin (methocarbamol), for pain not otherwise controlled by the above.

## 2025-03-08 NOTE — ED TRIAGE NOTES
Pt ambulates to bed 16 with c/o 4 day hx of chest pain that is worse with movement, position. Pt states she has had an occasional cough and that the pain began while she was doing house work.

## 2025-03-09 LAB
EKG ATRIAL RATE: 77 BPM
EKG DIAGNOSIS: NORMAL
EKG P AXIS: 71 DEGREES
EKG P-R INTERVAL: 138 MS
EKG Q-T INTERVAL: 354 MS
EKG QRS DURATION: 96 MS
EKG QTC CALCULATION (BAZETT): 400 MS
EKG R AXIS: -14 DEGREES
EKG T AXIS: 43 DEGREES
EKG VENTRICULAR RATE: 77 BPM

## 2025-03-09 PROCEDURE — 93010 ELECTROCARDIOGRAM REPORT: CPT | Performed by: STUDENT IN AN ORGANIZED HEALTH CARE EDUCATION/TRAINING PROGRAM

## 2025-03-10 ENCOUNTER — TELEPHONE (OUTPATIENT)
Age: 60
End: 2025-03-10

## 2025-03-10 NOTE — TELEPHONE ENCOUNTER
Patient needs to reschedule all three appointments showed up today for NP appointment Please call

## 2025-03-28 ENCOUNTER — TELEPHONE (OUTPATIENT)
Age: 60
End: 2025-03-28

## 2025-03-28 DIAGNOSIS — G43.011 INTRACTABLE MIGRAINE WITHOUT AURA AND WITH STATUS MIGRAINOSUS: Primary | ICD-10-CM

## 2025-03-28 RX ORDER — METHYLPREDNISOLONE 4 MG/1
TABLET ORAL
Qty: 1 KIT | Refills: 0 | Status: SHIPPED | OUTPATIENT
Start: 2025-03-28 | End: 2025-04-03

## 2025-03-28 NOTE — TELEPHONE ENCOUNTER
The patient called in stating she has had a migraine since yesterday. She has has taken butalbital-acetaminophen-caffeine, nurtec, and diclofenac sodium and it has not gone away. She is asking for suggestions.